# Patient Record
Sex: FEMALE | Race: WHITE
[De-identification: names, ages, dates, MRNs, and addresses within clinical notes are randomized per-mention and may not be internally consistent; named-entity substitution may affect disease eponyms.]

---

## 2018-02-05 ENCOUNTER — HOSPITAL ENCOUNTER (INPATIENT)
Dept: HOSPITAL 17 - NEPC | Age: 38
LOS: 1 days | Discharge: HOME | DRG: 287 | End: 2018-02-06
Attending: HOSPITALIST | Admitting: HOSPITALIST
Payer: SELF-PAY

## 2018-02-05 VITALS
DIASTOLIC BLOOD PRESSURE: 82 MMHG | SYSTOLIC BLOOD PRESSURE: 172 MMHG | HEART RATE: 101 BPM | RESPIRATION RATE: 18 BRPM | OXYGEN SATURATION: 100 % | TEMPERATURE: 98.7 F

## 2018-02-05 VITALS — HEIGHT: 60 IN | WEIGHT: 242.51 LBS | BODY MASS INDEX: 47.61 KG/M2

## 2018-02-05 VITALS
HEART RATE: 89 BPM | DIASTOLIC BLOOD PRESSURE: 74 MMHG | SYSTOLIC BLOOD PRESSURE: 141 MMHG | RESPIRATION RATE: 18 BRPM | OXYGEN SATURATION: 100 %

## 2018-02-05 VITALS — TEMPERATURE: 98.4 F

## 2018-02-05 DIAGNOSIS — F17.210: ICD-10-CM

## 2018-02-05 DIAGNOSIS — M16.10: ICD-10-CM

## 2018-02-05 DIAGNOSIS — E66.01: ICD-10-CM

## 2018-02-05 DIAGNOSIS — I20.1: Primary | ICD-10-CM

## 2018-02-05 DIAGNOSIS — R03.0: ICD-10-CM

## 2018-02-05 DIAGNOSIS — M54.9: ICD-10-CM

## 2018-02-05 DIAGNOSIS — D64.9: ICD-10-CM

## 2018-02-05 LAB
BASOPHILS # BLD AUTO: 0.1 TH/MM3 (ref 0–0.2)
BASOPHILS NFR BLD: 0.7 % (ref 0–2)
EOSINOPHIL # BLD: 0.3 TH/MM3 (ref 0–0.4)
EOSINOPHIL NFR BLD: 2.7 % (ref 0–4)
ERYTHROCYTE [DISTWIDTH] IN BLOOD BY AUTOMATED COUNT: 18.3 % (ref 11.6–17.2)
HCT VFR BLD CALC: 27.3 % (ref 35–46)
HGB BLD-MCNC: 8.6 GM/DL (ref 11.6–15.3)
LYMPHOCYTES # BLD AUTO: 2.8 TH/MM3 (ref 1–4.8)
LYMPHOCYTES NFR BLD AUTO: 22.1 % (ref 9–44)
MCH RBC QN AUTO: 19.3 PG (ref 27–34)
MCHC RBC AUTO-ENTMCNC: 31.3 % (ref 32–36)
MCV RBC AUTO: 61.7 FL (ref 80–100)
MONOCYTE #: 0.6 TH/MM3 (ref 0–0.9)
MONOCYTES NFR BLD: 4.8 % (ref 0–8)
NEUTROPHILS # BLD AUTO: 8.9 TH/MM3 (ref 1.8–7.7)
NEUTROPHILS NFR BLD AUTO: 69.7 % (ref 16–70)
PLATELET # BLD: 398 TH/MM3 (ref 150–450)
PMV BLD AUTO: 6.7 FL (ref 7–11)
RBC # BLD AUTO: 4.43 MIL/MM3 (ref 4–5.3)
WBC # BLD AUTO: 12.7 TH/MM3 (ref 4–11)

## 2018-02-05 PROCEDURE — 74174 CTA ABD&PLVS W/CONTRAST: CPT

## 2018-02-05 PROCEDURE — 99153 MOD SED SAME PHYS/QHP EA: CPT

## 2018-02-05 PROCEDURE — 80048 BASIC METABOLIC PNL TOTAL CA: CPT

## 2018-02-05 PROCEDURE — 84484 ASSAY OF TROPONIN QUANT: CPT

## 2018-02-05 PROCEDURE — 82550 ASSAY OF CK (CPK): CPT

## 2018-02-05 PROCEDURE — 84702 CHORIONIC GONADOTROPIN TEST: CPT

## 2018-02-05 PROCEDURE — 71045 X-RAY EXAM CHEST 1 VIEW: CPT

## 2018-02-05 PROCEDURE — C1893 INTRO/SHEATH, FIXED,NON-PEEL: HCPCS

## 2018-02-05 PROCEDURE — 99285 EMERGENCY DEPT VISIT HI MDM: CPT

## 2018-02-05 PROCEDURE — C1769 GUIDE WIRE: HCPCS

## 2018-02-05 PROCEDURE — 85379 FIBRIN DEGRADATION QUANT: CPT

## 2018-02-05 PROCEDURE — 93458 L HRT ARTERY/VENTRICLE ANGIO: CPT

## 2018-02-05 PROCEDURE — 85025 COMPLETE CBC W/AUTO DIFF WBC: CPT

## 2018-02-05 PROCEDURE — 93005 ELECTROCARDIOGRAM TRACING: CPT

## 2018-02-05 PROCEDURE — 85610 PROTHROMBIN TIME: CPT

## 2018-02-05 PROCEDURE — 71275 CT ANGIOGRAPHY CHEST: CPT

## 2018-02-05 PROCEDURE — 99152 MOD SED SAME PHYS/QHP 5/>YRS: CPT

## 2018-02-05 PROCEDURE — 85730 THROMBOPLASTIN TIME PARTIAL: CPT

## 2018-02-05 PROCEDURE — 93306 TTE W/DOPPLER COMPLETE: CPT

## 2018-02-05 NOTE — RADRPT
EXAM DATE/TIME:  02/05/2018 23:12 

 

[place] COMPARISON:     

No previous studies available for comparison.

 

                     

INDICATIONS :     

Short of breath, chest pains.

                     

 

MEDICAL HISTORY :     

None.          

 

SURGICAL HISTORY :     

None.   

 

ENCOUNTER:     

Initial                                        

 

ACUITY:     

1 day      

 

PAIN SCORE:     

6/10

 

LOCATION:     

Bilateral  chest

 

FINDINGS:     

Portable AP view of the chest demonstrates a normal-sized cardiac silhouette. No effusion, consolidat
ion, or pneumothorax is visualized. The bones and soft tissues demonstrate no acute abnormality.

 

CONCLUSION:     

No acute cardiopulmonary abnormality is identified.

 

 

 

 Tejinder Bonilla MD on February 05, 2018 at 23:25           

Board Certified Radiologist.

 This report was verified electronically.

## 2018-02-06 VITALS
TEMPERATURE: 98.5 F | HEART RATE: 88 BPM | DIASTOLIC BLOOD PRESSURE: 59 MMHG | SYSTOLIC BLOOD PRESSURE: 116 MMHG | RESPIRATION RATE: 18 BRPM | OXYGEN SATURATION: 97 %

## 2018-02-06 VITALS
SYSTOLIC BLOOD PRESSURE: 120 MMHG | DIASTOLIC BLOOD PRESSURE: 56 MMHG | RESPIRATION RATE: 18 BRPM | HEART RATE: 86 BPM | OXYGEN SATURATION: 100 %

## 2018-02-06 VITALS
HEART RATE: 87 BPM | OXYGEN SATURATION: 100 % | DIASTOLIC BLOOD PRESSURE: 57 MMHG | RESPIRATION RATE: 18 BRPM | SYSTOLIC BLOOD PRESSURE: 115 MMHG

## 2018-02-06 VITALS
DIASTOLIC BLOOD PRESSURE: 60 MMHG | OXYGEN SATURATION: 100 % | RESPIRATION RATE: 14 BRPM | SYSTOLIC BLOOD PRESSURE: 121 MMHG | HEART RATE: 90 BPM

## 2018-02-06 VITALS — HEART RATE: 88 BPM

## 2018-02-06 LAB
BUN SERPL-MCNC: 9 MG/DL (ref 7–18)
CALCIUM SERPL-MCNC: 8.4 MG/DL (ref 8.5–10.1)
CHLORIDE SERPL-SCNC: 107 MEQ/L (ref 98–107)
CREAT SERPL-MCNC: 0.7 MG/DL (ref 0.5–1)
GFR SERPLBLD BASED ON 1.73 SQ M-ARVRAT: 94 ML/MIN (ref 89–?)
GLUCOSE SERPL-MCNC: 105 MG/DL (ref 74–106)
HCO3 BLD-SCNC: 24 MEQ/L (ref 21–32)
INR PPP: 1 RATIO
PROTHROMBIN TIME: 10.1 SEC (ref 9.8–11.6)
SODIUM SERPL-SCNC: 140 MEQ/L (ref 136–145)
TROPONIN I SERPL-MCNC: 0.06 NG/ML (ref 0.02–0.05)
TROPONIN I SERPL-MCNC: 1.7 NG/ML (ref 0.02–0.05)

## 2018-02-06 PROCEDURE — B2111ZZ FLUOROSCOPY OF MULTIPLE CORONARY ARTERIES USING LOW OSMOLAR CONTRAST: ICD-10-PCS | Performed by: INTERNAL MEDICINE

## 2018-02-06 PROCEDURE — 4A023N7 MEASUREMENT OF CARDIAC SAMPLING AND PRESSURE, LEFT HEART, PERCUTANEOUS APPROACH: ICD-10-PCS | Performed by: INTERNAL MEDICINE

## 2018-02-06 NOTE — CATHPROC
[facility] HIS Report

Study Information

Study Number    Admission          Scheduled Start             Study Start

 

[id number]    Feb 6 2018 4:29AM      02/06/2018 Feb 6 2018 9:50AM

 

[employer]

 

Cardiac Catheterization

 

Admit Source               Facility Department

 

Emergency department           [facility] - Cath Lab

 

Physician and Clinical Staff

Initial Harrison Christianson          Circulator     Mauro RN, Leroy

 

                         Recorder      Tawanna Espinoza ,RT(R)

 

                         Scrub        Ann Nuñez,RT(R)

 

Procedures Performed

Procedure                    Location (Site)            Vessel Name

 

Coronary Angiograms                LCA                 Left Coronary

Coronary Angiograms                RCA                 Right Coronary

L Heart Cath

Equipment

Time            Description           Size         Mfg Part Number  Used/Scraped

                   TRANSDUCER, TRUWAVE                  [id number]

10:01    RAPHAEL GUTIERREZ                      *                    Used

                   W/STOCKCOCK                      *[id number]

                   WIRE, CHOICE PT EX. SUPPORT              02535-03

10:28    [employer]                    180CM                  Used

                   182CM                         *[id number]

                                              [phone]



                                              *[id number]

                                              538-420



                                              *[id number]

                                              534-523T



                                              *[id number]

                                              [id number]

10:01    [employer]    PACK, CCL CUSTOM        *                    Used

                                              *[id number]

10:01    [employer]    SUPPORT, ARTERIAL ADULT                [id number] *[id number] Used

                                              [id number]

10:01    [employer]      PEN, SKIN DUAL W/ RULER     *                    Used

                                              *[id number]

                   BAND, RADIAL COMPRESSION TR              [id number]

10:39    [employer]                      24CM                  Used

                   SHORT 24                       *[id number]

                   SHEATH, FR6 RADIAL PRELUDE

10:01    [employer]                      FR 6         [id number]   Used

                   EASE 11CM

                                              [id number]

10:28    [employer]      WIRE, EXCHANGE 260CM 3MMJ    260CM                  Used

                                              *[id number]

                                              [id number]

10:01    [employer]      WIRE, EXCHANGE 260CM 3MMJ    260CM                  Used

                                              *[id number]

10:01    [employer]         OMNIPAQUE, 350 MG, 150ML    150ML         [id number]      Used

                                              [id number]

10:01    [facility]      BLANKET,WARM AIR CCL      *                    Used

                                              *[id number]

 

Equipment Model, Serial, Lot Number and Expiration Data

Description              Model Number      Serial Number      Lot Number        Expiration Date

WIRE, CHOICE PT EX. SUPPORT

                                           [id number]         06-

182CM

 

History: Allergies

Allergy              Reaction

 

diphenhydramine

 

 

History: Risk Factors

                     Family History of

Hypertension    Dyslipidemia               Previous MI   Previous Heart Failure

                     Premature CAD

No         No          No         No        No

Prior Valve

          Prior PCI      Prior CABG

Surgery

No         No          No

          Cerebrovascular   Peripheral Artery  Chronic Lung

On Dialysis                                  Diabetes

          Disease       Disease       Disease

No         No          No         No        No

 

 

History: Symptoms/Diagnosis Selection Items

Chest pain

 

SOB

History: Stress Tests

Stress or Imaging Studies Performed

 

No

 

 

History: Other

Current Smoker    Method      Packs a Day      Years Used       Pack Years

 

Yes         Cigarettes    1           21           21

 

Labs

Hgb (g/dl)      Hct (%)         RBC (MIL/MM3)    WBC (l/cumm)      Platelets (thousands)

 

11.60-17.00     35.00-51.00       4.00-5.90      4.00-11.00       150..00

 

8.6         27.3          4.4         12.7          398

 

Glucose (mg/dl)   BUN (mg/dl)       Creatinine (mg/dl)  BUN:Creatinine (1:x)

74..00     7.00-18.00       0.50-1.30      10.00-20.00

 

105         9            0.7         12.9

 

Na (meq/l)      K (meq/l)        Cl (meq/l)      CO2 (mmol/L)      Ca (mg/dl)

 

136..00    3.50-5.10        98..00     21.00-32.00      8.50-10.10

 

140         3.9           107         24           8.4

 

PT (sec)       PTT (sec)          INR (PTT:PT)

 

9.80-11.60      24.30-30.10         0.90-1.10

 

10.1         27             1

 

Troponin I (ng/ml)  CPK (u/l)

 

0.02-0.05      26..00

 

1.7         124

 

 

 

 

Medication

Medication Total Dose (Bolus/Oral)

Medication            Total Dosage/Unit

 

1% XYLOCAINE              20 mL

 

FENTANYL               150 mcg

 

HEPARIN               5000 units

 

RADIAL COCKTAIL              4 mL (Bolus)

 

VERSED                  3 mg

Medications (Bolus/Oral)

Medication          Time Given          Dosage/Unit       Administered By      Reason

 

FENTANYL           2/6/2018 10:20:00 AM      50 mcg         Leroy Wade RN

50 mcg FENTANYL given in lab by Leroy Wade RN in Right Forearm via Peripheral IV. Ordered by Harrison Angel.

 

VERSED            2/6/2018 10:21:19 AM      1 mg         Leroy Wade RN

1 mg VERSED given in lab by Leroy Wade RN in Right Forearm via Peripheral IV. Ordered by Amor Angel

 

1% XYLOCAINE         2/6/2018 10:24:01 AM      20 mL         Harrison Angel

20 mL 1% XYLOCAINE given in lab by Harrison Angel in Right Radial via Subcutaneous. Ordered by Harrison Angel.

 

HEPARIN            2/6/2018 10:25:30 AM     5000 units        Leroy Wade RN

5000 units HEPARIN given in lab by Leroy Wade RN in Right Forearm via Peripheral IV. Ordered by Harrison Garcia.

 

RADIAL COCKTAIL        2/6/2018 10:26:24 AM      2 mL (Bolus)     Harrison Angel

2 mL (Bolus) RADIAL COCKTAIL given in lab by Harrison Angel in Right Radial via Radial. Using [Soluti
on Name]. Ordered by Harrison Angel. 200

MCG NTG

FENTANYL           2/6/2018 10:29:41 AM      25 mcg         Leroy Wade RN

25 mcg FENTANYL given in lab by Leroy Wade RN in Right Forearm via Peripheral IV. Ordered by Harrison Angel.

 

VERSED            2/6/2018 10:30:45 AM      1 mg         Leroy Wade RN

1 mg VERSED given in lab by Leroy Wade RN in Right Forearm via Peripheral IV. Ordered by Amor Angel
phen.

 

RADIAL COCKTAIL        2/6/2018 10:32:21 AM      2 mL (Bolus)     Harrison Angel

2 mL (Bolus) RADIAL COCKTAIL given in lab by Harrison Angel in Right Radial via Radial. Using [Soluti
on Name]. Ordered by Harrison Angel. 200

MCG NTG

FENTANYL           2/6/2018 10:34:00 AM      25 mcg         Mauro GRAMAJO, Leroy

25 mcg FENTANYL given in lab by Mauro GRAMAJO, Leroy in Right Forearm via Peripheral IV. Ordered by Harrison Angel.

 

VERSED            2/6/2018 10:35:00 AM      1 mg         Mauro GRAMAJO, Leroy

1 mg VERSED given in lab by Leory Wade RN in Right Forearm via Peripheral IV. Ordered by Amor Angel.

 

FENTANYL           2/6/2018 10:45:13 AM      50 mcg         Leroy Wade RN

50 mcg FENTANYL given in lab by Leroy Wade RN in Right Forearm via Peripheral IV. Ordered by Harrison Angel.

 

Medication (Drip)

Medication          Time Given          Dosage/Unit      Concentration/Unit  Diluent (ml)  Solution

 

IV Solutions         2/6/2018 9:50:44 AM       0 mL (IV)                 500       NaCl .9

IV Solutions given in lab by Leroy Wade RN in Right Forearm via Peripheral IV. Pump/Drip Flow = 20 
ml/hr using NaCl .9. Ordered by Harrison Angel.

Initial Case Assessment

Cardiovascular

HR           Rhythm          NIBP           Chest Pain

 

93           sr            135/84          0

 

Edema Present      Skin color            Skin

 

None           Normal              Warm Dry

 

Circulatory - Right Pulses

Dorsalis Pedis     Femoral         Radial

 

1            2            2

 

Scale (0,1,2,3,4,d)

 

Scale (0,1,2,3,4,d)

 

Neurological State

            Oriented to time-place-

Alert                      Moves all extremities

            person

 

Respiration - General

Respiration Rate

            SpO2 (%)

(B/min)

20           99

 

Final Case Assessment

Cardiovascular

HR           Rhythm          NIBP           Chest Pain

 

94           sr            144/82          0

 

Edema Present      Skin color            Skin

 

None           Normal              Warm Dry

 

Circulatory - Right Pulses

Dorsalis Pedis     Femoral         Radial

 

1            2            2

 

Scale (0,1,2,3,4,d)

 

Scale (0,1,2,3,4,d)

 

Neurological State

            Oriented to time-place-

Alert                      Moves all extremities

            person

 

Respiration - General

Respiration Rate

            SpO2 (%)

(B/min)

20           99

 

Chronological Log

Time    Study Chronological Log

 

9:50:29   Patient arrived via Bed.

 

9:50:30   Patient Name, D.O.B, / Armband Verified By R.N.

 

9:50:31   Consent signed by the physician and the patient and verified by the Cath Lab staff.

9:50:32  Pre-op and post- op instructions given; patient acknowledges understanding of instructions.

 

9:50:32  Verbal Stimulation=2 Physical Stimulation=2 Airway=2 Respiration=2 TOTAL=8. (0=absent, 1=duff
ited, 2=present)

 

9:50:34  Presedation assessment performed by Cath Lab RN.

 

9:50:35  Allens test performed on the left radial and ulnar artery.

 

9:50:39  Patient has been NPO for More than 6Hrs.

 

9:50:40  Skin Breakdown- none per patient

 

9:50:41  Patient Warmer Placed on the Table.

 

9:50:41  [place] Prominences Protected

 

9:50:43  A # 20 IV was noted in the Forearm (right). Grade = patent

      IV Solutions given in lab by Leroy Wade RN in Right Forearm via Peripheral IV. Pump/Drip Flow
 = 20 ml/hr using NaCl

9:50:44

      .9. Ordered by Harrison Angel.

9:50:45  History and physical on the chart or being dictated.

      Assessment: Initial Case, HR=93 BPM, Rhythm=sr, SCRA=759/84 mmhg, Chest Pain=0, Edema=None, Col
or=Normal,

      Skin = Warm, Dry

9:50:46  Right Pulses: Korey Ped=1, Femoral=2, Radial=2

      Neurological: State=Alert, Ox3, RIZVI

      Respiration: Resp=20 B/min, SpO2=99 %

9:54:36  A # 20 IV was noted in the Antecubital (left). Grade = patent

      Vitals capture started with the following parameters, Patient=Adult, Interval=5 min, Initial Pr
ijinwj=560 mmHg,

9:55:59

      Deflation Rate=5 mmHg, Cuff placed on Left Arm

9:57:09  CNSX=097/81 mmhg, SvV4=346.0 %

 

10:01:37  HR=90 bpm, VLND=222/84 mmhg, SpO2=99.0 %, Resp=25 B/min

 

10:03:14  Right groin and right radial prepped with 2% chlorhexidine, and draped after a 3 min. waiti
ng time.

 

10:06:20  Reference ECG taken

 

10:06:36  HR=89 bpm, ARGH=710/73 mmhg, GnF4=663.0 %, Resp=15 B/min, Pain=0, Ginna=6, Longo=2

 

10:09:02  MD paged

 

10:11:35  HR=88 bpm, TIOG=621/74 mmhg, XjO5=009.0 %, Resp=13 B/min, Pain=0, Ginna=6, Longo=2

 

10:11:37  MD responded

 

10:12:40  Pressure channel 1 zeroed.

 

10:16:34  HR=83 bpm, SIIC=572/76 mmhg, LoW2=982.0 %, Resp=16 B/min, Pain=0, Ginna=6, Longo=2

 

10:20:00  50 mcg FENTANYL given in lab by Leroy Wade RN in Right Forearm via Peripheral IV. Ordered
 by Harrison Angel.

 

10:20:02  MD arrived.

 

10:21:19  1 mg VERSED given in lab by Leroy Wade RN in Right Forearm via Peripheral IV. Ordered by 
Harrison Angel.

 

10:21:35  HR=86 bpm, ZQUY=572/78 mmhg, WoT7=364.0 %, Resp=14 B/min, Pain=0, Ginna=6, Longo=2

      Time Out. Correct patient, correct procedure, correct physician, power injector not loaded with
 contrast with surgical

10:22:00

      team present. Time Out Concurred by MD and individual staff in procedure.

10:23:20  Case Start

 

10:24:01  20 mL 1% XYLOCAINE given in lab by Harrison Angel in Right Radial via Subcutaneous. Ordered
 by Harrison Angel.

 

10:24:40  Access site was Right Radial Artery.

 

10:25:30  5000 units HEPARIN given in lab by Leroy Wade RN in Right Forearm via Peripheral IV. Orde
red by Harrison Angel.

      A SHEATH, FR6 RADIAL PRELUDE EASE 11CM FR 6 was advanced into the Radial (right) using the Perc
utaneous

10:25:49

      technique.

      2 mL (Bolus) RADIAL COCKTAIL given in lab by Harrison Angel in Right Radial via Radial. Using [
Solution Name].

10:26:24

      Ordered by Harrison Angel. 200 MCG NTG

10:26:34  HR=90 bpm, IZVI=689/78 mmhg, SpO2=96.0 %, Resp=18 B/min, Pain=0, Ginna=6, Longo=2

      A JR 5.0 INFINITI CATHETER FR 5 was advanced over a wire. OMNIPAQUE, 350 MG, 150ML 150ML was us
ed for

10:26:48

      injections.

      Recorded Pressure: LV, HR=86, Condition=Condition 1

10:29:39

      (Left Ventricle) /6/11

10:29:41  25 mcg FENTANYL given in lab by Leroy Wade RN in Right Forearm via Peripheral IV. Ordered
 by Harrison Angel.

      Recorded Pressure: LV, Ao, HR=86, Condition=Condition 1

10:29:45  (Left Ventricle) /6/15,

      (Aorta) Ao 107/67/85

10:30:45  1 mg VERSED given in lab by Leroy Wade RN in Right Forearm via Peripheral IV. Ordered by 
Harrison Angel.

 

10:30:53  The  RCA was injected and visualized at various angles. OMNIPAQUE, 350 MG, 150ML 150ML used
.

 

10:31:16  Catheter was removed

      A JL 3.5 INFINITI CATHETER FR 5 was advanced over a wire. OMNIPAQUE, 350 MG, 150ML 150ML was us
ed for

10:31:26

      injections.

10:31:31  HR=88 bpm, MCMU=810/86 mmhg, SpO2=98.0 %, Resp=14 B/min, Pain=0, Ginna=6, Longo=2

      2 mL (Bolus) RADIAL COCKTAIL given in lab by Harrison Angel in Right Radial via Radial. Using [
Solution Name].

10:32:21

      Ordered by Harrison Angel. 200 MCG NTG

10:33:28  Catheter was removed

 

10:34:00  25 mcg FENTANYL given in lab by Leroy Wade RN in Right Forearm via Peripheral IV. Ordered
 by Harrison Angel.

      A JL 4.0 INFINITI CATHETER FR 4 was advanced over a wire. OMNIPAQUE, 350 MG, 150ML 150ML was us
ed for

10:34:14

      injections.

10:35:00  1 mg VERSED given in lab by Leroy Wade RN in Right Forearm via Peripheral IV. Ordered by 
Harrison Angel.

 

10:36:34  HR=88 bpm, DRPQ=748/82 mmhg, SpO2=96.0 %, Resp=18 B/min, Pain=0, Ginna=6, Longo=2

      Recorded Pressure: Ao, HR=93, Condition=Condition 1

10:37:42

      (Aorta) Ao 112/74/90

10:37:46  The  LCA was injected and visualized at various angles. OMNIPAQUE, 350 MG, 150ML 150ML used
.

 

10:38:59  Catheter was removed

 

10:39:06  Case End

      Assessment: Final Case, HR=94 BPM, Rhythm=sr, RLDQ=446/82 mmhg, Chest Pain=0, Edema=None, Color
=Normal,

      Skin = Warm, Dry

10:39:28  Right Pulses: Korey Ped=1, Femoral=2, Radial=2

      Neurological: State=Alert, Ox3, RIZVI

      Respiration: Resp=20 B/min, SpO2=99 %

10:39:53  Catheter(s) removed without difficulty

      Radial Compression Device Used. 11 mLs of air placed in BAND, RADIAL COMPRESSION TR SHORT 24 24
CM. Affected

10:40:05

      hand 97 % O2 saturation.

10:40:20  Sterile dressing applied to site

 

10:40:21  No case complications noted.

 

10:40:22  Cine recording checked.

 

10:40:23  Bedside Report will be given.

 

10:40:35  Contrast Scanned

 

10:40:40  A Left Heart Cath was performed.

 

10:41:38  HR=93 bpm, LMGV=991/84 mmhg, SpO2=97.0 %, Resp=21 B/min, Pain=0, Ginna=6, Longo=2

 

10:45:13  50 mcg FENTANYL given in lab by Leroy Wade RN in Right Forearm via Peripheral IV. Ordered
 by Harrison Angel.

 

10:46:33  HR=91 bpm, HRUY=021/90 mmhg, SpO2=98.0 %, Resp=20 B/min, Pain=0, Ginna=6, Longo=2

 

10:49:46  Vitals capture stopped.

 

10:52:11  [facility]

End Study - Contrast Media Used In Study

Contrast      Total Opened (mL)  Total Used (mL)     Total Wasted (mL)

 

Omnipaque     25         25            0

 

End Study - Maximum Contrast Load

Max Contrast Load (mL)

 

785.7

 

End Study - Radiation Exposure

Fluoro Time

(minutes)

3.8

 

 

End Study - Patient Disposition

Complications   Transferred To       Interventional Outcome

 

No         Telemetry Bed       No attempt made

## 2018-02-06 NOTE — MA
cc:

NATALIA CARDOZO

****

 

 

DATE

2/6/2018

 

INDICATION

Non-ST-elevation MI.

 

PROCEDURE PERFORMED

1. Fluoroscopy with interpretation

2. Coronary angiography

3. Left heart catheterization

 

METHOD

The risks, benefits and alternatives discussed with the patient, the patient

understood and consented to the procedure.

 

PROCEDURE

The patient was brought into the catheterization lab, placed on the

catheterization table.  The right wrist was prepped and draped in a sterile

fashion.  The right wrist was anesthetized with 2% lidocaine.  The right radial

artery is cannulated and a 6-Macedonian 7 cm sheath was placed without difficulty.

 

LEFT HEART CATHETERIZATION

Intraventricular hemodynamics measured at 116/6 mmHg.  The left end-diastolic

pressure was 15 mmHg.

 

CORONARY ANGIOGRAPHY

The left main coronary is angiographically normal.

 

The left anterior descending coronary artery is tortuous, but angiographically

normal.  The diagonal branch is angiographically normal.

 

The left circumflex gives rise to a larger obtuse marginal branch and is

angiographically normal.

 

The right coronary artery is a dominant vessel giving rise to the posterior

descending branch and is angiographically normal.

 

CONCLUSIONS

1. Angiographically normal coronary arteries.

2. Normal left-sided filling pressures.

 

PLAN

The patient will be monitored closely for any post procedural complications.

Troponin elevation is likely secondary to coronary vasospasm with Prinzmetal

angina as her symptoms were suggested.  There is no evidence for spontaneous

coronary dissection, anomalous coronary artery, or fixed obstructive coronary

disease.  We will start on baby aspirin.  We will hold off on beta blocker

given that we are going to initiate a non-dihydropyridine calcium channel

blocker for coronary vasospasm.  Since there is no traditional atherosclerosis,

statin therapy is not indicated.  We will follow up with a 2-D echocardiogram.

I will watch her today just to make sure she does not have any arrhythmias with

a small troponin elevation and anticipate discharge tomorrow.

 

 

 

 

 

                              _________________________________

                              MD KELLY Long/MONE

D:  2/6/2018/10:50 AM

T:  2/6/2018/11:06 AM

Visit #:  [id number]

Job #:  [id number]

## 2018-02-06 NOTE — EKG
Date Performed: 02/05/2018       Time Performed: 23:09:28

 

PTAGE:      37 years

 

EKG:      Sinus rhythm 

 

 NORMAL ECG Since the prior tracing, there has been no significant change 

 

 PREVIOUS TRACING            : 04/29/2016 09.26

 

DOCTOR:   Dejon Guerrero  Interpretating Date/Time  02/06/2018 16:11:05

## 2018-02-06 NOTE — EKG
Date Performed: 02/06/2018       Time Performed: 09:37:27

 

PTAGE:      37 years

 

EKG:      Sinus rhythm 

 

 NORMAL ECG Since the prior tracing, there has been no significant change 

 

 PREVIOUS TRACING            : 02/06/2018 02.50

 

DOCTOR:   Dejon Guerrero  Interpretating Date/Time  02/06/2018 15:48:30

## 2018-02-06 NOTE — PD.CONS
HPI


Consult Requested By





Primary Care Physician


Connie West MD


History of Present Illness


37-year-old female with a past medical history of hip OA who presented for 

chest pain.  The patient states that she was walking out of the bathroom last 

night and developed sudden onset of chest pain.  She describes the pain as a 

squeezing sensation.  8/10 in severity.  Pain is been constant overnight, 

denies any change.  Pain radiates to the upper back and right arm.  Associated 

FRANCIS and had an episode of vomiting after onset.  She denies any relieving 

factors.  She denies any history of HTN, HLD, DM, or heart disease.  She denies 

any family history of heart disease or early MI.  In the ED she had a normal d-

dimer and CTA of the chest was negative for dissection or aneurysm.


 (Colby Borrego)





Review of Systems


Negative except as stated in the history of present illness


 (Colby Borrego)





Past Family Social History


Allergies:  


Coded Allergies:  


     diphenhydramine (Unverified  Allergy, Unknown, 18)


Past Medical History


Hip osteoarthritis


Morbid obesity


Past Surgical History


 2


Cholecystectomy


Tonsillectomy


Reported Medications





Reported Meds & Active Scripts


Active


Mobic (Meloxicam) 15 Mg Tab 15 Mg PO DAILY PRN


Active Ordered Medications





Current Medications








 Medications


  (Trade)  Dose


 Ordered  Sig/Ronnell


 Route  Start Time


 Stop Time Status Last Admin


 


 Heparin Sodium/


 Dextrose  250 ml @ 


 10 mls/hr  TITRATE  PRN


 IV  18 04:45


    18 06:13


 


 


 Potassium


 Chloride/Dextrose/


 Sod Cl  1,000 ml @ 


 75 mls/hr  [id number]


 IV  18 05:26


    18 06:31


 


 


  (NS Flush)  2 ml  BID


 IV FLUSH  18 09:00


     


 


 


  (NS Flush)  2 ml  UNSCH  PRN


 IV FLUSH  18 05:30


     


 


 


  (Tylenol)  500 mg  Q4H  PRN


 PO  18 05:30


     


 


 


  (Morphine Inj)  4 mg  Q3H  PRN


 IV PUSH  18 05:30


     


 








Family History


Reports grandmother has diabetes.  Denies any family history of heart disease 

or early MI.


Social History


Smokes half a pack daily


 (Colby Borrego)





Physical Exam


Vital Signs





Vital Signs








  Date Time  Temp Pulse Resp B/P (MAP) Pulse Ox O2 Delivery O2 Flow Rate FiO2


 


18 07:34 98.5 88 18 116/59 (78) 97   


 


18 06:48  86 18 120/56 (77) 100 Room Air  


 


18 06:48        


 


18 04:54  90 14 121/60 (80) 100   


 


18 03:07  87 18 115/57 (76) 100   


 


18 23:49  89 18 141/74 (96) 100   


 


18 22:40   22     


 


18 22:22 98.7 101 18 172/82 (112) 100   








Physical Exam


GENERAL: Well-developed well-nourished.  Morbidly obese.  In no acute distress.


NECK: No carotid bruits.  No JVD.  


CARDIOVASCULAR: Regular rate and rhythm.  No murmur appreciated.


RESPIRATORY: No accessory muscle use. Clear to auscultation. Breath sounds 

equal bilaterally. 


MUSCULOSKELETAL: No clubbing or cyanosis. No edema. 


NEUROLOGICAL: Awake and alert. Normal speech.


Laboratory





Laboratory Tests








Test


  18


23:39 18


23:42 18


02:45


 


White Blood Count 12.7   


 


Red Blood Count 4.43   


 


Hemoglobin 8.6   


 


Hematocrit 27.3   


 


Mean Corpuscular Volume 61.7   


 


Mean Corpuscular Hemoglobin 19.3   


 


Mean Corpuscular Hemoglobin


Concent 31.3 


  


  


 


 


Red Cell Distribution Width 18.3   


 


Platelet Count 398   


 


Mean Platelet Volume 6.7   


 


Neutrophils (%) (Auto) 69.7   


 


Lymphocytes (%) (Auto) 22.1   


 


Monocytes (%) (Auto) 4.8   


 


Eosinophils (%) (Auto) 2.7   


 


Basophils (%) (Auto) 0.7   


 


Neutrophils # (Auto) 8.9   


 


Lymphocytes # (Auto) 2.8   


 


Monocytes # (Auto) 0.6   


 


Eosinophils # (Auto) 0.3   


 


Basophils # (Auto) 0.1   


 


CBC Comment DIFF FINAL   


 


Differential Comment    


 


Blood Urea Nitrogen 9   


 


Creatinine 0.70   


 


Random Glucose 105   


 


Calcium Level 8.4   


 


Sodium Level 140   


 


Potassium Level 3.9   


 


Chloride Level 107   


 


Carbon Dioxide Level 24.0   


 


Anion Gap 9   


 


Estimat Glomerular Filtration


Rate 94 


  


  


 


 


Total Creatine Kinase 58   


 


Troponin I 0.06   0.42 


 


Prothrombin Time  10.1  


 


Prothromb Time International


Ratio 


  1.0 


  


 


 


Activated Partial


Thromboplast Time 


  27.0 


  


 


 


D-Dimer Quantitative (PE/DVT)  0.37  








 (Colby Borrego)


Result Diagram:  


18 2339                                                                    

            18 2339





Imaging





Last Impressions








Aorta CTA 18 0000 Signed





Impressions: 





 Service Date/Time:  2018 01:43 - CONCLUSION:  1. No acute 





 finding is identified within the abdomen or pelvis. There is no aneurysm or 





 dissection. 2. There is a 5.8 cm left adnexal cystic lesion that appears 

simple. 





 This likely arises from the left ovary given the anatomic location. Given the 





 size suggest follow-up ultrasound imaging in approximately 6-8 weeks to 

confirm 





 resolution.     Tejinder Bonilla MD 


 


Chest X-Ray 18 2241 Signed





Impressions: 





 Service Date/Time:  2018 23:12 - CONCLUSION:  No acute 





 cardiopulmonary abnormality is identified.     Tejinder Bonilla MD 








 (Colby Borrego)





Assessment and Plan


Assessment and Plan


37-year-old female with a past medical history of hip OA who presented for 

chest pain.  The patient states that she was walking out of the bathroom last 

night and developed sudden onset of chest pain.  She describes the pain as a 

squeezing sensation.  8/10 in severity.  Pain is been constant overnight, 

denies any change.  Pain radiates to the upper back and right arm.  Associated 

FRANCIS and had an episode of vomiting after onset.  She denies any relieving 

factors.  She denies any history of HTN, HLD, DM, or heart disease.  She denies 

any family history of heart disease or early MI.  In the ED she had a normal d-

dimer and CTA of the chest was negative for dissection or aneurysm.





Atypical chest pain/elevated troponin: Troponin is elevated to 0.42 and patient 

placed on heparin drip. EKG w/ NSR. Possible NSTEMI.


 (Colby Borrego)


Assessment and Plan


NSTEMI - + chest pain.  trop 0.06 -> 0.42


discussed options.  proceed with [facility] for definitive diagnosis.  rule out 

congenital anomaly vs spontaneous dissection vs printzmetals vs plaque rupture


b-HCG stat


2d echo


turn heparin gtt off on way to lab


NPO


 (Harrison Angel MD)











Colby Borrego 2018 08:12


Harrison Angel MD 2018 08:23

## 2018-02-06 NOTE — HHI.DCPOC
Discharge Care Plan


Goals to Promote Your Health


* To prevent worsening of your condition and complications


* To maintain your health at the optimal level


Directions to Meet Your Goals


*** Take your medications as prescribed


*** Follow your dietary instruction


*** Follow activity as directed








*** Keep your appointments as scheduled


*** Take your immunizations and boosters as scheduled


*** If your symptoms worsen call your PCP, if no PCP go to Urgent Care Center 

or Emergency Room***


*** Smoking is Dangerous to Your Health. Avoid second hand smoke***


***Call the 24-hour hour crisis hotline for domestic abuse at [phone]***











Chanel Villegas MD Feb 6, 2018 11:23

## 2018-02-06 NOTE — EKG
Date Performed: 02/06/2018       Time Performed: 02:50:20

 

PTAGE:      37 years

 

EKG:      Sinus rhythm 

 

 INFERIOR MYOCARDIAL INFARCTION ABNORMAL ECG Since the prior tracing, there has been no significant c
tiara 

 

 PREVIOUS TRACING            : 02/05/2018 23.09

 

DOCTOR:   Dejon Guerrero  Interpretating Date/Time  02/06/2018 15:48:21

## 2018-02-06 NOTE — HHI.HP
__________________________________________________





[facility]


Service


[facility]


Primary Care Physician


Connie West MD


Admission Diagnosis





Non-STEMI


Diagnoses:  


Travel History


International Travel<30 Days:  No


Contact w/Intl Traveler <30 Da:  No


Traveled to Known Affected Are:  No


History of Present Illness


37-year-old female with no significant past medical history presents to the 

emergency department for the evaluation of chest pain.  She describes the chest 

pain is substernal and a tightness.  It is associated with shortness of breath.

  Denies diaphoresis.  She states the pain radiates down her right arm into her 

upper back.  She endorses several episodes of nausea/vomiting.  Denies fever/

chills.  She denies lower extremity swelling.





Review of Systems


Except as stated in HPI:  all other systems reviewed are Neg





Past Family Social History


Past Medical History


None


Past Surgical History


 2


Cholecystectomy


Tonsillectomy


Reported Medications





Reported Meds & Active Scripts


Active


Mobic (Meloxicam) 15 Mg Tab 15 Mg PO DAILY PRN


Allergies:  


Coded Allergies:  


     diphenhydramine (Unverified  Allergy, Unknown, 18)


Family History


Negative for DM/CAD


Social History


Smokes approximately half a pack per day.  Occasional alcohol.  Denies illicit 

drugs.





Physical Exam


Vital Signs





Vital Signs








  Date Time  Temp Pulse Resp B/P (MAP) Pulse Ox O2 Delivery O2 Flow Rate FiO2


 


18 04:54  90 14 121/60 (80) 100   


 


18 03:07  87 18 115/57 (76) 100   


 


18 23:49  89 18 141/74 (96) 100   


 


18 22:40   22     


 


18 22:22 98.7 101 18 172/82 (112) 100   








Physical Exam


GENERAL: Obese,  female sitting up in bed


SKIN: No rashes, ecchymoses or lesions. Cool and dry.


HEAD: Atraumatic. Normocephalic. No temporal or scalp tenderness.


EYES: Pupils equal round and reactive. Extraocular motions intact. No scleral 

icterus. No injection or drainage. 


ENT: Nose without bleeding, purulent drainage or septal hematoma. Throat 

without erythema, tonsillar hypertrophy or exudate. Uvula midline. Airway 

patent.


NECK: Trachea midline. No JVD or lymphadenopathy. Supple, nontender, no 

meningeal signs.


CARDIOVASCULAR: Regular rate and rhythm without murmurs, gallops, or rubs. 


RESPIRATORY: Clear to auscultation. Breath sounds equal bilaterally. No wheezes

, rales, or rhonchi.  


GASTROINTESTINAL: Abdomen soft, non-tender, nondistended. No hepato-splenomegaly

, or palpable masses. No guarding.


MUSCULOSKELETAL: Extremities without clubbing, cyanosis, or edema. No joint 

tenderness, effusion, or edema noted. No calf tenderness. 


NEUROLOGICAL: Awake and alert. Cranial nerves II through XII intact.  Motor and 

sensory grossly within normal limits. Normal speech.


Laboratory





Laboratory Tests








Test


  18


23:39 18


23:42 18


02:45


 


White Blood Count 12.7   


 


Red Blood Count 4.43   


 


Hemoglobin 8.6   


 


Hematocrit 27.3   


 


Mean Corpuscular Volume 61.7   


 


Mean Corpuscular Hemoglobin 19.3   


 


Mean Corpuscular Hemoglobin


Concent 31.3 


  


  


 


 


Red Cell Distribution Width 18.3   


 


Platelet Count 398   


 


Mean Platelet Volume 6.7   


 


Neutrophils (%) (Auto) 69.7   


 


Lymphocytes (%) (Auto) 22.1   


 


Monocytes (%) (Auto) 4.8   


 


Eosinophils (%) (Auto) 2.7   


 


Basophils (%) (Auto) 0.7   


 


Neutrophils # (Auto) 8.9   


 


Lymphocytes # (Auto) 2.8   


 


Monocytes # (Auto) 0.6   


 


Eosinophils # (Auto) 0.3   


 


Basophils # (Auto) 0.1   


 


CBC Comment DIFF FINAL   


 


Differential Comment    


 


Blood Urea Nitrogen 9   


 


Creatinine 0.70   


 


Random Glucose 105   


 


Calcium Level 8.4   


 


Sodium Level 140   


 


Potassium Level 3.9   


 


Chloride Level 107   


 


Carbon Dioxide Level 24.0   


 


Anion Gap 9   


 


Estimat Glomerular Filtration


Rate 94 


  


  


 


 


Total Creatine Kinase 58   


 


Troponin I 0.06   0.42 


 


D-Dimer Quantitative (PE/DVT)  0.37  








Result Diagram:  


18








Caprini VTE Risk Assessment


Caprini VTE Risk Assessment:  Mod/High Risk (score >= 2)


Caprini Risk Assessment Model











 Point Value = 1          Point Value = 2  Point Value = 3  Point Value = 5


 


Age 41-60


Minor surgery


BMI > 25 kg/m2


Swollen legs


Varicose veins


Pregnancy or postpartum


History of unexplained or recurrent


   spontaneous 


Oral contraceptives or hormone


   replacement


Sepsis (< 1 month)


Serious lung disease, including


   pneumonia (< 1 month)


Abnormal pulmonary function


Acute myocardial infarction


Congestive heart failure (< 1 month)


History of inflammatory bowel disease


Medical patient at bed rest Age 61-74


Arthroscopic surgery


Major open surgery (> 45 min)


Laparoscopic surgery (> 45 min)


Malignancy


Confined to bed (> 72 hours)


Immobilizing plaster cast


Central venous access Age >= 75


History of VTE


Family history of VTE


Factor V Leiden


Prothrombin [id number]


Lupus anticoagulant


Anticardiolipin antibodies


Elevated serum homocysteine


Heparin-induced thrombocytopenia


Other congenital or acquired


   thrombophilia Stroke (< 1 month)


Elective arthroplasty


Hip, pelvis, or leg fracture


Acute spinal cord injury (< 1 month)








Prophylaxis Regimen











   Total Risk


Factor Score Risk Level Prophylaxis Regimen


 


0-1      Low Early ambulation


 


2 Moderate Order ONE of the following:


*Sequential Compression Device (SCD)


*Heparin 5000 units SQ BID


 


3-4 Higher Order ONE of the following medications:


*Heparin 5000 units SQ TID


*Enoxaparin/Lovenox 40 mg SQ daily (WT < 150 kg, CrCl > 30 mL/min)


*Enoxaparin/Lovenox 30 mg SQ daily (WT < 150 kg, CrCl > 10-29 mL/min)


*Enoxaparin/Lovenox 30 mg SQ BID (WT < 150 kg, CrCl > 30 mL/min)


AND/OR


*Sequential Compression Device (SCD)


 


5 or more Highest Order ONE of the following medications:


*Heparin 5000 units SQ TID (Preferred with Epidurals)


*Enoxaparin/Lovenox 40 mg SQ daily (WT < 150 kg, CrCl > 30 mL/min)


*Enoxaparin/Lovenox 30 mg SQ daily (WT < 150 kg, CrCl > 10-29 mL/min)


*Enoxaparin/Lovenox 30 mg SQ BID (WT < 150 kg, CrCl > 30 mL/min)


AND


*Sequential Compression Device (SCD)











Assessment and Plan


Assessment and Plan


Assessment/plan:





1.  NSTEMI


EKG shows normal sinus rhythm, pulse 86, no ST segment elevations or depressions

, personally reviewed


Initial troponin 0.06, repeat 0.42


Heparin drip


Aspirin, Plavix


Morphine for pain


Telemetry


Serial troponin/EKGs


Cardiology consulted, appreciate recommendations





2.  Osteoarthritis


Pain control as above


Holding home Mobic





FEN


NPO


D5 1/2 NS + 20 KCl at 75 cc/hr


Electrolytes: monitor and replete prn


Heparin ggt





Physician Certification


2 Midnight Certification Type:  Admission for Inpatient Services


Order for Inpatient Services


The services are ordered in accordance with Medicare regulations or non-

Medicare payer requirements, as applicable.  In the case of services not 

specified as inpatient-only, they are appropriately provided as inpatient 

services in accordance with the 2-midnight benchmark.


Estimated LOS (days):  2


2 days is the estimated time the patient will need to remain in the hospital, 

assuming treatment plan goals are met and no additional complications.


Post-Hospital Plan:  Not yet determined











Beena Jackman MD 2018 05:33

## 2018-02-06 NOTE — ECHRPT
Indication:

 

 CONCLUSIONS

 The left ventricular systolic function is hyperdynamic with an estimated ejection fraction in the ra
nge of 65-

 70%. 

 Normal left ventricular size. 

 Wall thickness is normal. 

 

 BP:  116   / 59      HR: 88                       Rhythm:           Sinus

 

 MEASUREMENTS  (Male / Female) Normal Values       Technical Quality:Fair

 2D ECHO

 LV Diastolic Diameter PLAX        4.7 cm                4.2 - 5.9 / 3.9 - 5.3 cm

 LV Systolic Diameter PLAX         3.2 cm                

 IVS Diastolic Thickness           1.0 cm                0.6 - 1.0 / 0.6 - 0.9 cm

 LVPW Diastolic Thickness          1.0 cm                0.6 - 1.0 / 0.6 - 0.9 cm

 LV Relative Wall Thickness        0.4                   

 LVOT Diameter                     1.9 cm                

 LA Systolic Diameter LX           3.9 cm                3.0 - 4.0 / 2.7 - 3.8 cm

 LV Ejection Fraction MOD 4C       65.3 %                

 LV Cardiac Index MOD 4C           1936.4 cm/minm     

 LV Ejection Fraction 4C AL        66.1 %                

 LV Cardiac Index 4C AL            2009.6 cm/minm     

 

 M-MODE

 Aortic Root Diameter MM           2.4 cm                

 AV Cusp Separation MM             1.8 cm                

 

 DOPPLER

 AV Peak Velocity                  166.0 cm/s            

 AV Peak Gradient                  11.0 mmHg             

 LVOT Peak Velocity                120.0 cm/s            

 LVOT Peak Gradient                5.8 mmHg              

 AV Area Cont Eq pk                2.0 cm               

 MV Area PHT                       3.9 cm               

 Mitral E Point Velocity           114.0 cm/s            

 Mitral A Point Velocity           80.9 cm/s             

 Mitral E to A Ratio               1.4                   

 PV Peak Velocity                  122.0 cm/s            

 PV Peak Gradient                  6.0 mmHg              

 

 

 FINDINGS

 

 LEFT VENTRICLE

 The left ventricular systolic function is hyperdynamic with an estimated ejection fraction in the ra
nge of 65-

 70%. 

 Normal left ventricular size. 

 Wall thickness is normal. 

 

 RIGHT VENTRICLE

 Normal right ventricular size and systolic function.  

 

 LEFT ATRIUM

 The left atrial size is normal.  

 

 RIGHT ATRIUM

 The right atrial size is normal.  

 

 ATRIAL SEPTUM

 Normal atrial septal thickness without atrial level shunting by limited color doppler interrogation.
  

 

 AORTA

 The aortic root and proximal ascending aorta are normal in size on limited imaging.  

 

 MITRAL VALVE

 Structurally normal mitral valve. No mitral valve stenosis or regurgitation.  

 

 AORTIC VALVE

 Trileaflet aortic valve. No aortic valve stenosis or regurgitation.  

 

 TRICUSPID VALVE

 Structurally normal tricuspid valve. No tricuspid valve stenosis or regurgitation.  

 

 PULMONARY VALVE

 The pulmonary valve is not well visualized.  

 

 VESSELS

 The inferior vena cava is normal in size.  

 

 PERICARDIUM

 No pericardial effusion.  

 

 

 

 

  Harrison Angel MD, FACC

  (Electronically Signed)

  Final Date:06 February 2018 17:29

## 2018-02-06 NOTE — RADRPT
EXAM DATE/TIME:  2018 01:43 

 

[facility] COMPARISON:     

No previous studies available for comparison.

 

 

INDICATIONS :     

Chest and back pain.

                           

 

IV CONTRAST:     

70 cc Omnipaque 350 (iohexol) IV 

 

 

RADIATION DOSE:     

20.52 CTDIvol (mGy) 

 

 

MEDICAL HISTORY :     

None  

 

SURGICAL HISTORY :      

Cholecystectomy.  section.

 

ENCOUNTER:      

Initial

 

ACUITY:      

1 day

 

PAIN SCALE:      

8/10

 

LOCATION:        

chest back

 

TECHNIQUE:     

Volumetric scanning was performed using a multi-row detector CT scanner.  The data was post processed
 with a variety of visualization algorithms including full volume maximum intensity projection, multi
-planar sliding thin slab reformation, curved planar reformation, and surface rendering techniques.  
Using automated exposure control and adjustment of the mA and/or kV according to patient size, radiat
ion dose was kept as low as reasonably achievable to obtain optimal diagnostic quality images.  DICOM
 format image data is available electronically for review and comparison.  

 

FINDINGS:     

 

LUNGS:     

There is no consolidation or pneumothorax.  No concerning pulmonary nodule is visualized.  No pleural
 fluid is present.

 

MEDIASTINUM:     

No abnormally enlarged lymph nodes by CT criteria. No axillary or hilar abnormalities are identified.
 Heart demonstrates no abnormality. 

 

ABDOMEN:     

There is been prior cholecystectomy. The liver, adrenal glands, kidneys, spleen, and pancreas demonst
rate no significant abnormality. There is no free air or free fluid.

 

PELVIS:     

There is a cystic lesion in the left pelvis measuring 5.8 cm. It likely arises from the left ovary. B
ladder demonstrates no abnormality.

 

THORACIC AORTA:       

The thoracic aortic root is normal with normal branching of the great vessels.  There is no evidence 
of aneurysm or dissection. 

 

ABDOMINAL AORTA:     

The aorta is normal in caliber without aneurysm or dissection.  The renal arteries are patent bilater
ally.  The proximal celiac and superior mesenteric arteries are patent and normal in diameter.   

 

PELVIC VESSELS:     

The internal iliac and external iliac vessels are patent without aneurysm or stenosis.

 

CONCLUSION:     

1. No acute finding is identified within the abdomen or pelvis. There is no aneurysm or dissection.

2. There is a 5.8 cm left adnexal cystic lesion that appears simple. This likely arises from the left
 ovary given the anatomic location. Given the size suggest [facility]
ely 6-8 weeks to confirm resolution.

 

 

 

 Tejinder Bonilla MD on 2018 at 2:18           

Board Certified Radiologist.

 This report was verified electronically.

## 2018-02-06 NOTE — PD
HPI


.


Chest pain


Chief Complaint:  Chest Pain


Time Seen by Provider:  23:08


Travel History


International Travel<30 days:  No


Contact w/Intl Traveler<30days:  No


Traveled to known affect area:  No





History of Present Illness


HPI


37-year-old female complains of relatively sudden onset substernal chest pain 

radiating to right shoulder and right arm, then her left shoulder and left arm, 

followed by her upper back that this evening.  Pain is described as being dull 

ache like squeezing type pain, patient clenching her fist in front of her chest 

to describe the pain.  Further history taking is the patient has had pain in 

her back over the past 2 weeks which is been worse upon lying down.  Patient 

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  Patient 

denies any shortness of breath or change in exercise tolerance lately.  Denies 

any significant leg pain leg swelling, no sedentary period or confounding 

travel of late.





PFSH


Past Medical History


*** Narrative Medical


Past medical history reviewed


Anemia:  Yes


Arthritis:  Yes (L HAND)


Anxiety:  Yes


Diminished Hearing:  No


Pregnant?:  Not Pregnant


:  2


Ovarian Cysts:  Yes





Past Surgical History


 Section:  Yes (2)


Cholecystectomy:  Yes


Gynecologic Surgery:  Yes ()


Tonsillectomy:  Yes





Social History


Alcohol Use:  Yes (OCCASIONALLY)


Tobacco Use:  Yes (1/2 PPD)


Substance Use:  No





Allergies-Medications


(Allergen,Severity, Reaction):  


Coded Allergies:  


     diphenhydramine (Unverified  Allergy, Unknown, 18)


Reported Meds & Prescriptions





Reported Meds & Active Scripts


Active


Mobic (Meloxicam) 15 Mg Tab 15 Mg PO DAILY PRN





Narrative Medication


Allergies and medications reviewed





Review of Systems


Except as stated in HPI:  all other systems reviewed are Neg


General / Constitutional:  No: Fever


Eyes:  No: Visual changes


HENT:  No: Headaches


Cardiovascular:  Positive: Chest Pain or Discomfort


Respiratory:  No: Shortness of Breath


Gastrointestinal:  No: Abdominal Pain


Genitourinary:  No: Dysuria


Musculoskeletal:  No: Pain


Skin:  No Rash


Neurologic:  No: Weakness


Psychiatric:  No: Depression


Endocrine:  No: Polydipsia


Hematologic/Lymphatic:  No: Easy Bruising





Physical Exam


Narrative


GENERAL: Awake alert oriented 3 no acute distress.  Vital signs patient had 

notable hypertension from triage at 220/124, repeated, normalized without 

treatment.  Patient is morbidly obese


SKIN: Warm and dry.  Color is normal no diaphoresis cyanosis or pallor


HEAD: Atraumatic. Normocephalic. 


EYES: Pupils equal and round. No scleral icterus. No injection or drainage. 


ENT: No nasal bleeding or discharge.  Mucous membranes pink and moist.


NECK: Trachea midline. No JVD.  Supple full range of motion


CARDIOVASCULAR: Regular rate and rhythm.  S1-S2 no murmurs rubs or gallops


RESPIRATORY: No accessory muscle use. Clear to auscultation. Breath sounds 

equal bilaterally. 


GASTROINTESTINAL: Abdomen soft, non-tender, nondistended. Hepatic and splenic 

margins not palpable. 


MUSCULOSKELETAL: Extremities without clubbing, cyanosis, or edema. No obvious 

deformities. 


NEUROLOGICAL: Awake and alert. No obvious cranial nerve deficits.  Motor 

grossly within normal limits. Five out of 5 muscle strength in the arms and 

legs.  Normal speech.


PSYCHIATRIC: Appropriate mood and affect; insight and judgment normal.





Data


Data


Last Documented VS





Vital Signs








  Date Time  Temp Pulse Resp B/P (MAP) Pulse Ox O2 Delivery O2 Flow Rate FiO2


 


18 03:07  87 18 115/57 (76) 100   


 


18 22:22 98.7       








Orders





 Orders


Electrocardiogram (18 22:41)


Complete Blood Count With Diff (18 22:41)


Basic Metabolic Panel (Bmp) (18 22:41)


Ckmb (Isoenzyme) Profile (18 22:41)


Troponin I (18 22:41)


Chest, Single Ap (18 22:41)


D-Dimer (18 23:14)


Alprazolam (Xanax) (18 23:15)


Nitroglycerin 2% Oint (Nitroglycerin 2% (18 00:00)


Cta Thor Abd Aorta W Iv C W3d (18 )


Iohexol 350 Inj (Omnipaque 350 Inj) (18 22:19)


Sodium Chloride 0.9% Flush (Ns Flush) (18 02:15)


Al-Mag Hy-Si 40-40-4 Mg/Ml Liq (Mag-Al P (18 02:15)


Lidocaine 2% Viscous (Xylocaine 2% Visco (18 02:15)


Troponin I (18 03:07)


Aspirin Chew (Aspirin Chew) (18 04:15)


Heparin Inj (Heparin Inj) (18 04:15)


Heparin-D5w 25,000 U/250 Ml (Heparin-D5w (18 04:15)


Cbc No Diff, Includes Plts (18 06:00)


Act Partial Throm Time (Ptt) (18 11:15)


Occult Blood (Hemoccult) Stool (18 04:15)


Clopidogrel (Plavix) (18 04:30)





Labs





Laboratory Tests








Test


  18


23:39 18


23:42 18


02:45


 


White Blood Count 12.7 TH/MM3   


 


Red Blood Count 4.43 MIL/MM3   


 


Hemoglobin 8.6 GM/DL   


 


Hematocrit 27.3 %   


 


Mean Corpuscular Volume 61.7 FL   


 


Mean Corpuscular Hemoglobin 19.3 PG   


 


Mean Corpuscular Hemoglobin


Concent 31.3 % 


  


  


 


 


Red Cell Distribution Width 18.3 %   


 


Platelet Count 398 TH/MM3   


 


Mean Platelet Volume 6.7 FL   


 


Neutrophils (%) (Auto) 69.7 %   


 


Lymphocytes (%) (Auto) 22.1 %   


 


Monocytes (%) (Auto) 4.8 %   


 


Eosinophils (%) (Auto) 2.7 %   


 


Basophils (%) (Auto) 0.7 %   


 


Neutrophils # (Auto) 8.9 TH/MM3   


 


Lymphocytes # (Auto) 2.8 TH/MM3   


 


Monocytes # (Auto) 0.6 TH/MM3   


 


Eosinophils # (Auto) 0.3 TH/MM3   


 


Basophils # (Auto) 0.1 TH/MM3   


 


CBC Comment DIFF FINAL   


 


Differential Comment    


 


Blood Urea Nitrogen 9 MG/DL   


 


Creatinine 0.70 MG/DL   


 


Random Glucose 105 MG/DL   


 


Calcium Level 8.4 MG/DL   


 


Sodium Level 140 MEQ/L   


 


Potassium Level 3.9 MEQ/L   


 


Chloride Level 107 MEQ/L   


 


Carbon Dioxide Level 24.0 MEQ/L   


 


Anion Gap 9 MEQ/L   


 


Estimat Glomerular Filtration


Rate 94 ML/MIN 


  


  


 


 


Total Creatine Kinase 58 U/L   


 


Troponin I 0.06 NG/ML   0.42 NG/ML 


 


D-Dimer Quantitative (PE/DVT)  0.37 MG/L FEU  











MDM


Medical Decision Making


Medical Screen Exam Complete:  Yes


Emergency Medical Condition:  Yes


Medical Record Reviewed:  Yes


Differential Diagnosis


Myocardial infarction, aortic dissection, pulmonary embolus, esophagitis, 

pericarditis


Narrative Course


EKG normal sinus rhythm 86 bpm, nonischemic.  Slight IVCD noted in inferior 

limb lead 3 and aVF


Chest x-ray normal


Laboratory examinations, troponin 0.06, 3 hour repeat 0.42.  D-dimer negative 

laboratory examinations otherwise unremarkable


CT aorta performed secondary to patient's pain rating her back and markedly 

elevated blood pressure at presentation.  Negative for acute poor embolus or 

aortic dissection


With conjunction of patient's symptomatology and evolution and cardiac enzymes, 

patient is treated as non-STEMI, started on heparin ACS protocol, aspirin and 

Plavix.


Case discussed with Dr. [facility], admitted





Diagnosis





 Primary Impression:  


 Non-STEMI (non-ST elevated myocardial infarction)





Admitting Information


Admitting Physician Requests:  Admit











Bo Marcelo MD 2018 04:26

## 2018-02-11 NOTE — PQ
Physician Query Response Document

 

 

PATIENT:               MANDY ARANDA

ACCT #:                  [id number]

MRN:                       [id number]

:                       1980

ADMIT DATE:       2018 4:29 AM

DISCH DATE:

RESPONDING

PROVIDER #:        [email or web address]

 

 

QUERY TEXT:

 

Clarification of Clinical Diagnostic Findings

 

Please clarify documentation or clinical relevance for the clinical / diagnostic findings or whether 
those are insignificant or unable to be further specified.

 

PLEASE INDICATE IF THE DOCUMENTED DX OF NSTEMI WAS:

-- Ruled in

-- Ruled out

-- Undetermined

-- Other

 

The patient's Clinical Indicators include:

18 Admission Diagnosis

Non-STEMI

 

PER INITIAL H

EKG shows normal sinus rhythm, pulse 86, no ST segment elevations or depressions, personally reviewed


Initial troponin 0.06, repeat 0.42

Heparin drip

 

PER 18 CARDIAC CATH REPORT -  CONCLUSIONS

1. Angiographically normal coronary arteries.

2. Normal left-sided filling pressures.

 

CARDIOLOGIST STATES - Troponin elevation is likely secondary to coronary vasospasm with Prinzmetal

angina as her symptoms were suggested.  There is no evidence for spontaneous

coronary dissection, anomalous coronary artery, or fixed obstructive coronary

disease.

 

 

 

 

Query created by: Vonnie Martins on 2018 1:04 PM

 

RESPONSE TEXT:

 

Patient with Prinzmetal angina, s/p cardiac cath shows angiographically normal coronary arteries, als
o normal pressure filling of left ventricle

 

 

 

 

 

 

 

Electronically signed by:  Chanel Villegas MD 2018 1:22 PM

## 2018-05-18 ENCOUNTER — HOSPITAL ENCOUNTER (OUTPATIENT)
Dept: HOSPITAL 17 - NEPE | Age: 38
Setting detail: OBSERVATION
LOS: 5 days | Discharge: HOME | End: 2018-05-23
Attending: HOSPITALIST | Admitting: HOSPITALIST
Payer: SELF-PAY

## 2018-05-18 VITALS
HEART RATE: 94 BPM | DIASTOLIC BLOOD PRESSURE: 59 MMHG | SYSTOLIC BLOOD PRESSURE: 130 MMHG | OXYGEN SATURATION: 94 % | TEMPERATURE: 98.5 F | RESPIRATION RATE: 18 BRPM

## 2018-05-18 VITALS — WEIGHT: 220.46 LBS | BODY MASS INDEX: 43.28 KG/M2 | HEIGHT: 60 IN

## 2018-05-18 DIAGNOSIS — M17.0: ICD-10-CM

## 2018-05-18 DIAGNOSIS — I25.10: ICD-10-CM

## 2018-05-18 DIAGNOSIS — I77.6: ICD-10-CM

## 2018-05-18 DIAGNOSIS — D64.9: ICD-10-CM

## 2018-05-18 DIAGNOSIS — Z83.3: ICD-10-CM

## 2018-05-18 DIAGNOSIS — L03.115: Primary | ICD-10-CM

## 2018-05-18 DIAGNOSIS — F41.1: ICD-10-CM

## 2018-05-18 DIAGNOSIS — Z72.0: ICD-10-CM

## 2018-05-18 DIAGNOSIS — A46: ICD-10-CM

## 2018-05-18 DIAGNOSIS — I25.2: ICD-10-CM

## 2018-05-18 LAB
ALBUMIN SERPL-MCNC: 3.9 GM/DL (ref 3.4–5)
ALP SERPL-CCNC: 99 U/L (ref 45–117)
ALT SERPL-CCNC: 27 U/L (ref 10–53)
AST SERPL-CCNC: 20 U/L (ref 15–37)
BASOPHILS # BLD AUTO: 0 TH/MM3 (ref 0–0.2)
BASOPHILS NFR BLD AUTO: 1 % (ref 0–2)
BASOPHILS NFR BLD: 0.4 % (ref 0–2)
BILIRUB SERPL-MCNC: 0.6 MG/DL (ref 0.2–1)
BUN SERPL-MCNC: 15 MG/DL (ref 7–18)
CALCIUM SERPL-MCNC: 9.3 MG/DL (ref 8.5–10.1)
CHLORIDE SERPL-SCNC: 102 MEQ/L (ref 98–107)
CREAT SERPL-MCNC: 1.05 MG/DL (ref 0.5–1)
DOHLE BOD BLD QL SMEAR: PRESENT
EOSINOPHIL # BLD: 0.1 TH/MM3 (ref 0–0.4)
EOSINOPHIL NFR BLD: 0.9 % (ref 0–4)
ERYTHROCYTE [DISTWIDTH] IN BLOOD BY AUTOMATED COUNT: 32 % (ref 11.6–17.2)
GFR SERPLBLD BASED ON 1.73 SQ M-ARVRAT: 59 ML/MIN (ref 89–?)
GLUCOSE SERPL-MCNC: 103 MG/DL (ref 74–106)
HCO3 BLD-SCNC: 22.5 MEQ/L (ref 21–32)
HCT VFR BLD CALC: 37.6 % (ref 35–46)
HGB BLD-MCNC: 11.8 GM/DL (ref 11.6–15.3)
LYMPHOCYTES # BLD AUTO: 1.5 TH/MM3 (ref 1–4.8)
LYMPHOCYTES NFR BLD AUTO: 15.8 % (ref 9–44)
LYMPHOCYTES: 10 % (ref 9–44)
MCH RBC QN AUTO: 22.7 PG (ref 27–34)
MCHC RBC AUTO-ENTMCNC: 31.5 % (ref 32–36)
MCV RBC AUTO: 72 FL (ref 80–100)
MONOCYTE #: 0.6 TH/MM3 (ref 0–0.9)
MONOCYTES NFR BLD: 6.2 % (ref 0–8)
MONOCYTES: 4 % (ref 0–8)
NEUTROPHILS # BLD AUTO: 7.1 TH/MM3 (ref 1.8–7.7)
NEUTROPHILS NFR BLD AUTO: 76.7 % (ref 16–70)
NEUTS BAND # BLD MANUAL: 7.5 TH/MM3 (ref 1.8–7.7)
NEUTS BAND NFR BLD: 13 % (ref 0–6)
NEUTS SEG NFR BLD MANUAL: 69 % (ref 16–70)
OVALOCYTES BLD QL SMEAR: (no result)
PLATELET # BLD: 279 TH/MM3 (ref 150–450)
PMV BLD AUTO: 7.4 FL (ref 7–11)
PROT SERPL-MCNC: 8.6 GM/DL (ref 6.4–8.2)
RBC # BLD AUTO: 5.22 MIL/MM3 (ref 4–5.3)
SODIUM SERPL-SCNC: 135 MEQ/L (ref 136–145)
WBC # BLD AUTO: 9.2 TH/MM3 (ref 4–11)

## 2018-05-18 PROCEDURE — 99285 EMERGENCY DEPT VISIT HI MDM: CPT

## 2018-05-18 PROCEDURE — 86038 ANTINUCLEAR ANTIBODIES: CPT

## 2018-05-18 PROCEDURE — 86140 C-REACTIVE PROTEIN: CPT

## 2018-05-18 PROCEDURE — 85652 RBC SED RATE AUTOMATED: CPT

## 2018-05-18 PROCEDURE — 96372 THER/PROPH/DIAG INJ SC/IM: CPT

## 2018-05-18 PROCEDURE — 85025 COMPLETE CBC W/AUTO DIFF WBC: CPT

## 2018-05-18 PROCEDURE — 96361 HYDRATE IV INFUSION ADD-ON: CPT

## 2018-05-18 PROCEDURE — 80048 BASIC METABOLIC PNL TOTAL CA: CPT

## 2018-05-18 PROCEDURE — 96375 TX/PRO/DX INJ NEW DRUG ADDON: CPT

## 2018-05-18 PROCEDURE — 85007 BL SMEAR W/DIFF WBC COUNT: CPT

## 2018-05-18 PROCEDURE — G0378 HOSPITAL OBSERVATION PER HR: HCPCS

## 2018-05-18 PROCEDURE — 84481 FREE ASSAY (FT-3): CPT

## 2018-05-18 PROCEDURE — 80053 COMPREHEN METABOLIC PANEL: CPT

## 2018-05-18 PROCEDURE — 86430 RHEUMATOID FACTOR TEST QUAL: CPT

## 2018-05-18 PROCEDURE — 87040 BLOOD CULTURE FOR BACTERIA: CPT

## 2018-05-18 PROCEDURE — 85027 COMPLETE CBC AUTOMATED: CPT

## 2018-05-18 PROCEDURE — 96376 TX/PRO/DX INJ SAME DRUG ADON: CPT

## 2018-05-18 PROCEDURE — 96366 THER/PROPH/DIAG IV INF ADDON: CPT

## 2018-05-18 PROCEDURE — 96365 THER/PROPH/DIAG IV INF INIT: CPT

## 2018-05-18 PROCEDURE — 84443 ASSAY THYROID STIM HORMONE: CPT

## 2018-05-18 PROCEDURE — 84436 ASSAY OF TOTAL THYROXINE: CPT

## 2018-05-18 NOTE — PD
HPI


Chief Complaint:  Edema


Time Seen by Provider:  20:41


Travel History


International Travel<30 days:  No


Contact w/Intl Traveler<30days:  No


Traveled to known affect area:  No





History of Present Illness


HPI


Patient is a 37-year-old female who is got 2 days of redness and warmth 

tenderness of her right lower ankle it started in her foot instep as well as 

the lower anterior tib-fib area it is painful it is red it is warm she denies 

any injury she denies any trauma she has a tattoo there but that has been there 

for years she is not a diabetic only past medical history is that she has had a 

MI a year ago angioplasty with no stents she has not taken any medication for 

this it is pressure-like pain it is warm it is burning it is localized to the 

distal tib-fib and foot right-sided





PFSH


Past Medical History


Anemia:  Yes


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


Anxiety:  Yes


Depression:  No


Cancer:  No


Cardiovascular Problems:  Yes


Cerebrovascular Accident:  No


Coronary Artery Disease:  Yes (AMI )


Diminished Hearing:  No


Endocrine:  No


Genitourinary:  No


Headaches:  Yes


Immune Disorder:  No


Musculoskeletal:  Yes


Neurologic:  Yes


Psychiatric:  Yes


Reproductive:  Yes (ENDOMETRIOSIS)


Respiratory:  Yes


Migraines:  Yes


Seizures:  No


Tetanus Vaccination:  Unknown


Pregnant?:  Not Pregnant


LMP:  1 MONTH AGO


:  2


Ovarian Cysts:  Yes


Tubal Ligation:  Yes





Past Surgical History


Abdominal Surgery:  Yes (CSECTIONS, GALLBLADDER)


Cardiac Surgery:  No


 Section:  Yes (2)


Cholecystectomy:  Yes


Ear Surgery:  No


Endocrine Surgery:  No


Eye Surgery:  No


Genitourinary Surgery:  No


Gynecologic Surgery:  Yes ()


Oral Surgery:  No (EXTRACTION)


Thoracic Surgery:  No


Tonsillectomy:  Yes


Other Surgery:  Yes (CSECTION, GALLBLADDER, TONSILS)





Social History


Alcohol Use:  Yes (OCCASIONALLY)


Tobacco Use:  Yes (1/2 PPD)


Substance Use:  No





Allergies-Medications


(Allergen,Severity, Reaction):  


Coded Allergies:  


     diphenhydramine (Unverified  Allergy, Unknown, 2/15/18)


Reported Meds & Prescriptions





Reported Meds & Active Scripts


Active


Aspirin DR (Aspirin) 81 Mg Tabdr 81 Mg PO DAILY


Mobic (Meloxicam) 15 Mg Tab 15 Mg PO DAILY PRN


Reported


Ferrous Sulfate 325 Mg (65 Mg Iron) Tablet 325 Mg PO TIDPC


Metoprolol Tartrate 25 Mg Tab 12.5 Mg PO BID


Atorvastatin (Atorvastatin Calcium) 20 Mg Tab 20 Mg PO HS


Diclofenac Sodium DR (Diclofenac Sodium) 75 Mg Tabdr 75 Mg PO BID








Review of Systems


Except as stated in HPI:  all other systems reviewed are Neg


General / Constitutional:  Positive: Fever


Skin:  Positive Rash, Positive Change in Pigmentation





Physical Exam


Narrative


GENERAL: 


SKIN: Warm and dry.


HEAD: Atraumatic. Normocephalic. 


EYES: Pupils equal and round. No scleral icterus. No injection or drainage. 


ENT: No nasal bleeding or discharge.  Mucous membranes pink and moist.


NECK: Trachea midline. No JVD. 


CARDIOVASCULAR: Regular rate and rhythm.  


RESPIRATORY: No accessory muscle use. Clear to auscultation. Breath sounds 

equal bilaterally. 


GASTROINTESTINAL: Abdomen soft, non-tender, nondistended. Hepatic and splenic 

margins not palpable. 


MUSCULOSKELETAL: Extremities   right  lower  tibial area petechial  and  red  

erythema  tender  and  warmth ,  appears  cellulitis  over possible  vasculitis


NEUROLOGICAL: Awake and alert. No obvious cranial nerve deficits.  Motor 

grossly within normal limits. Five out of 5 muscle strength in the arms and 

legs.  Normal speech.


PSYCHIATRIC: Appropriate mood and affect; insight and judgment normal.





Data


Data


Last Documented VS





Orders





 Orders


Vancomycin Inj (Vancomycin Inj) (18 21:30)


Complete Blood Count With Diff (18 21:30)


Comprehensive Metabolic Panel (18 21:30)


Blood Culture (18 21:30)


Ketorolac Inj (Toradol Inj) (18 22:30)


Ketorolac Inj (Toradol Inj) (18 22:30)


Morphine Inj (Morphine Inj) (18 22:30)


Morphine Inj (Morphine Inj) (18 23:45)


Westergren Sedimentation Rate (18 00:49)


C-Reactive Protein (Crp) (18 00:49)


Admit Order (Ed Use Only) (18 04:58)





Labs





Laboratory Tests








Test


  18


21:55


 


White Blood Count 9.2 TH/MM3 


 


Red Blood Count 5.22 MIL/MM3 


 


Hemoglobin 11.8 GM/DL 


 


Hematocrit 37.6 % 


 


Mean Corpuscular Volume 72.0 FL 


 


Mean Corpuscular Hemoglobin 22.7 PG 


 


Mean Corpuscular Hemoglobin


Concent 31.5 % 


 


 


Red Cell Distribution Width 32.0 % 


 


Platelet Count 279 TH/MM3 


 


Mean Platelet Volume 7.4 FL 


 


Neutrophils (%) (Auto) 76.7 % 


 


Lymphocytes (%) (Auto) 15.8 % 


 


Monocytes (%) (Auto) 6.2 % 


 


Eosinophils (%) (Auto) 0.9 % 


 


Basophils (%) (Auto) 0.4 % 


 


Neutrophils # (Auto) 7.1 TH/MM3 


 


Lymphocytes # (Auto) 1.5 TH/MM3 


 


Monocytes # (Auto) 0.6 TH/MM3 


 


Eosinophils # (Auto) 0.1 TH/MM3 


 


Basophils # (Auto) 0.0 TH/MM3 


 


CBC Comment AUTO DIFF 


 


Differential Total Cells


Counted 100 


 


 


Neutrophils % (Manual) 69 % 


 


Band Neutrophils % 13 % 


 


Lymphocytes % 10 % 


 


Monocytes % 4 % 


 


Eosinophils % 3 % 


 


Basophils % 1 % 


 


Neutrophils # (Manual) 7.5 TH/MM3 


 


Differential Comment


  FINAL DIFF


MANUAL


 


Dohle Bodies PRESENT 


 


Platelet Estimate NORMAL 


 


Platelet Morphology Comment NORMAL 


 


Ovalocytes 1+ 


 


Erythrocyte Sedimentation Rate 69 mm/hr 


 


Blood Urea Nitrogen 15 MG/DL 


 


Creatinine 1.05 MG/DL 


 


Random Glucose 103 MG/DL 


 


Total Protein 8.6 GM/DL 


 


Albumin 3.9 GM/DL 


 


Calcium Level 9.3 MG/DL 


 


Alkaline Phosphatase 99 U/L 


 


Aspartate Amino Transf


(AST/SGOT) 20 U/L 


 


 


Alanine Aminotransferase


(ALT/SGPT) 27 U/L 


 


 


Total Bilirubin 0.6 MG/DL 


 


Sodium Level 135 MEQ/L 


 


Potassium Level 3.2 MEQ/L 


 


Chloride Level 102 MEQ/L 


 


Carbon Dioxide Level 22.5 MEQ/L 


 


Anion Gap 11 MEQ/L 


 


Estimat Glomerular Filtration


Rate 59 ML/MIN 


 


 


C-Reactive Protein 21.40 MG/DL 











[facility]


Medical Decision Making


Medical Screen Exam Complete:  Yes


Emergency Medical Condition:  Yes


Differential Diagnosis


cellulitis  vs  vasculitis  with  suprainfection  vs   other  trauma   venous 

insufficiency  other


Narrative Course


cellulitis  treated with  Vanco 1 gr in  ER   Redness has   appearance which 

could also be  vasculitis,  ESR  elevated and  wbc  12  and  CRP  elevated as 

well I will let  inpt decide if  steroid indicated





Diagnosis





 Primary Impression:  


 Cellulitis


Scripts


Hydrocodone/Acetaminophen (Hydrocodone-Acetamin  mg) 10 Mg-325 Mg Tablet


1 TAB PO Q6H Y for PAIN SCALE 6 TO 10, #12 TAB


   Prov: Hong Johnston MD         18











Isaías Greenfield MD May 18, 2018 21:33

## 2018-05-19 VITALS
OXYGEN SATURATION: 99 % | TEMPERATURE: 98.8 F | SYSTOLIC BLOOD PRESSURE: 116 MMHG | DIASTOLIC BLOOD PRESSURE: 58 MMHG | HEART RATE: 86 BPM | RESPIRATION RATE: 20 BRPM

## 2018-05-19 VITALS
HEART RATE: 85 BPM | OXYGEN SATURATION: 97 % | SYSTOLIC BLOOD PRESSURE: 123 MMHG | DIASTOLIC BLOOD PRESSURE: 58 MMHG | TEMPERATURE: 99.1 F | RESPIRATION RATE: 24 BRPM

## 2018-05-19 VITALS
DIASTOLIC BLOOD PRESSURE: 69 MMHG | RESPIRATION RATE: 14 BRPM | OXYGEN SATURATION: 98 % | SYSTOLIC BLOOD PRESSURE: 136 MMHG | HEART RATE: 71 BPM

## 2018-05-19 VITALS
SYSTOLIC BLOOD PRESSURE: 142 MMHG | OXYGEN SATURATION: 99 % | RESPIRATION RATE: 14 BRPM | HEART RATE: 68 BPM | DIASTOLIC BLOOD PRESSURE: 71 MMHG

## 2018-05-19 VITALS
RESPIRATION RATE: 18 BRPM | DIASTOLIC BLOOD PRESSURE: 58 MMHG | OXYGEN SATURATION: 97 % | SYSTOLIC BLOOD PRESSURE: 126 MMHG | HEART RATE: 80 BPM | TEMPERATURE: 99.2 F

## 2018-05-19 VITALS
DIASTOLIC BLOOD PRESSURE: 50 MMHG | HEART RATE: 98 BPM | SYSTOLIC BLOOD PRESSURE: 93 MMHG | OXYGEN SATURATION: 97 % | RESPIRATION RATE: 18 BRPM

## 2018-05-19 VITALS
TEMPERATURE: 99 F | RESPIRATION RATE: 20 BRPM | HEART RATE: 83 BPM | SYSTOLIC BLOOD PRESSURE: 117 MMHG | DIASTOLIC BLOOD PRESSURE: 68 MMHG | OXYGEN SATURATION: 98 %

## 2018-05-19 VITALS
TEMPERATURE: 98.9 F | SYSTOLIC BLOOD PRESSURE: 131 MMHG | OXYGEN SATURATION: 99 % | HEART RATE: 84 BPM | RESPIRATION RATE: 16 BRPM | DIASTOLIC BLOOD PRESSURE: 65 MMHG

## 2018-05-19 VITALS
RESPIRATION RATE: 18 BRPM | SYSTOLIC BLOOD PRESSURE: 126 MMHG | OXYGEN SATURATION: 96 % | DIASTOLIC BLOOD PRESSURE: 60 MMHG | TEMPERATURE: 98.4 F | HEART RATE: 61 BPM

## 2018-05-19 RX ADMIN — HYDROCODONE BITARTRATE AND ACETAMINOPHEN PRN TAB: 10; 325 TABLET ORAL at 15:49

## 2018-05-19 RX ADMIN — SODIUM CHLORIDE SCH MLS/HR: 900 INJECTION INTRAVENOUS at 11:16

## 2018-05-19 RX ADMIN — PHENYTOIN SODIUM SCH MLS/HR: 50 INJECTION INTRAMUSCULAR; INTRAVENOUS at 07:25

## 2018-05-19 RX ADMIN — Medication SCH ML: at 09:25

## 2018-05-19 RX ADMIN — HYDROCODONE BITARTRATE AND ACETAMINOPHEN PRN TAB: 10; 325 TABLET ORAL at 21:31

## 2018-05-19 RX ADMIN — Medication SCH ML: at 21:36

## 2018-05-19 RX ADMIN — METHYLPREDNISOLONE SODIUM SUCCINATE SCH MG: 40 INJECTION, POWDER, FOR SOLUTION INTRAMUSCULAR; INTRAVENOUS at 21:35

## 2018-05-19 RX ADMIN — STANDARDIZED SENNA CONCENTRATE AND DOCUSATE SODIUM SCH TAB: 8.6; 5 TABLET, FILM COATED ORAL at 09:25

## 2018-05-19 RX ADMIN — HYDROCODONE BITARTRATE AND ACETAMINOPHEN PRN TAB: 10; 325 TABLET ORAL at 11:25

## 2018-05-19 RX ADMIN — STANDARDIZED SENNA CONCENTRATE AND DOCUSATE SODIUM SCH TAB: 8.6; 5 TABLET, FILM COATED ORAL at 21:30

## 2018-05-19 RX ADMIN — CEFEPIME SCH MLS/HR: 1 INJECTION, POWDER, FOR SOLUTION INTRAMUSCULAR; INTRAVENOUS at 06:43

## 2018-05-19 RX ADMIN — PHENYTOIN SODIUM SCH MLS/HR: 50 INJECTION INTRAMUSCULAR; INTRAVENOUS at 15:25

## 2018-05-19 RX ADMIN — HYDROCODONE BITARTRATE AND ACETAMINOPHEN PRN TAB: 10; 325 TABLET ORAL at 07:24

## 2018-05-19 RX ADMIN — ENOXAPARIN SODIUM SCH MG: 40 INJECTION SUBCUTANEOUS at 14:34

## 2018-05-19 RX ADMIN — CEFEPIME SCH MLS/HR: 1 INJECTION, POWDER, FOR SOLUTION INTRAMUSCULAR; INTRAVENOUS at 17:42

## 2018-05-19 NOTE — HHI.HP
__________________________________________________





[facility]


Service


[facility]


Primary Care Physician


Connie West MD


Admission Diagnosis





vasculitis Cellulitis


Diagnoses:  


Travel History


International Travel<30 Days:  No


Contact w/Intl Traveler <30 Da:  No


Traveled to Known Affected Are:  No


History of Present Illness


Mrs. Godoy is a 37 year old female.  She has a history of right lower 

extremity redness and swelling.  This has been progressive over the last 2 

days.  Increased swelling, redness, and pain have been a problem.  She decided 

to come in the emergency department due to the progression.  No fevers 

reported.  She has no previous history of this.  No other areas of the body are 

affected.





Review of Systems


Constitutional:  DENIES: Fatigue, Fever, Chills


Eyes:  DENIES: Diplopia, Eye inflammation, Eye pain


Ears, nose, mouth, throat:  DENIES: Hearing loss, Vertigo, Nasal discharge


Respiratory:  DENIES: Apneas, Cough, Snoring, Wheezing, Shortness of breath


Cardiovascular:  DENIES: Chest pain, Palpitations, Syncope


Gastrointestinal:  DENIES: Abdominal pain, Black stools, Bloody stools


Musculoskeletal:  DENIES: Joint pain, Muscle aches, Stiffness, Joint Swelling


Integumentary:  DENIES: Abnormal pigmentation, Pruritus, Rash, Nail changes


Hematologic/lymphatic:  DENIES: Bruising, Lymphadenopathy


Immunologic/allergic:  DENIES: Eczema, Urticaria


Neurologic:  DENIES: Abnormal gait, Headache, Paresthesias


Psychiatric:  DENIES: Anxiety, Confusion, Hallucinations





Past Family Social History


Past Medical History


Chronic Anemia


Osteoarthritis


General Anxiety Disorder


CAD


Old MI


Hx of Headaches


Endometriosis


Ovarian Cysts


Past Surgical History





Cholecystectomy


Tooth Extraction


Tonsillectomy





Reported Medications





Reported Meds & Active Scripts


Active


Aspirin DR (Aspirin) 81 Mg Tabdr 81 Mg PO DAILY


Mobic (Meloxicam) 15 Mg Tab 15 Mg PO DAILY PRN


Reported


Ferrous Sulfate 325 Mg (65 Mg Iron) Tablet 325 Mg PO TIDPC


Metoprolol Tartrate 25 Mg Tab 12.5 Mg PO BID


Atorvastatin (Atorvastatin Calcium) 20 Mg Tab 20 Mg PO HS


Diclofenac Sodium DR (Diclofenac Sodium) 75 Mg Tabdr 75 Mg PO BID


Allergies:  


Coded Allergies:  


     diphenhydramine (Unverified  Allergy, Unknown, 2/15/18)


Active Ordered Medications





Administered Medications








 Medications


  (Trade)  Dose


 Ordered  Sig/Ronnell


 Route


 PRN Reason  Start Time


 Stop Time Status Last Admin


Dose Admin


 


 Cefepime HCl 1000


 mg/Sodium Chloride  100 ml @ 


 200 mls/hr  Q12H


 IV


   18 06:00


    18 06:43


 


 


 Sodium Chloride  1,000 ml @ 


 100 mls/hr  Q10H


 IV


   18 05:09


    18 07:25


 


 


 Sodium Chloride


  (NS Flush)  2 ml  BID


 IV FLUSH


   18 09:00


    18 09:25


 


 


 Acetaminophen/


 Hydrocodone Bitart


  (Norco  Mg)  1 tab  Q4H  PRN


 PO


 PAIN SCALE 6 TO 10  18 05:15


    18 11:25


 


 


 Senna/Docusate


 Sodium


  (Debra-Colace)  1 tab  BID


 PO


   18 09:00


    18 09:25


 


 


 Vancomycin HCl


 1500 mg/Sodium


 Chloride  515 ml @ 


 250 mls/hr  Q18H


 IV


   18 11:00


    18 11:16


 








Family History


Maternal Grandmother:  DM2, Cancer NOS


Social History


Alcohol Use:  Yes (OCCASIONALLY)


Tobacco Use:  Yes (1/2 PPD)


Substance Use:  No





Physical Exam


Vital Signs





Vital Signs








  Date Time  Temp Pulse Resp B/P (MAP) Pulse Ox O2 Delivery O2 Flow Rate FiO2


 


18 11:54 98.8 86 20 116/58 (77) 99   


 


18 08:32 99.1 85 24 123/58 (79) 97   


 


18 08:21     (64)    


 


18 07:59  98 18 93/50 (64) 97 Room Air  


 


18 06:06 98.9 84 16 131/65 (87) 99 Room Air  


 


18 04:00  71 14 136/69 (91) 98 Room Air  


 


18 00:34   16     


 


18 00:34   16     


 


18 00:00  68 14 142/71 (94) 99 Room Air  


 


18 17:14 98.5 94 18 130/59 (82) 94   








Physical Exam


GENERAL: NAD, A&Ox3


HEAD: Normocephalic. 


NECK: Supple, trachea midline. No lymphadenopathy.


EYES: No scleral icterus. No injection or drainage. 


CARDIOVASCULAR: Regular rate and rhythm without murmurs, gallops, or rubs. 


RESPIRATORY: Breath sounds equal bilaterally. No accessory muscle use.


GASTROINTESTINAL: Abdomen soft, non-tender, nondistended. 


MUSCULOSKELETAL: No cyanosis, or edema. 


SKIN: Warm and dry.  Middle one half of the right lower extremity has cherry-

red erythema with swelling and induration below the affected area.  Patchy area 

of erythema is present at the right medial foot.


NEURO:  No focal neurological deficitis.


Laboratory





Laboratory Tests








Test


  18


21:55


 


White Blood Count 9.2 


 


Red Blood Count 5.22 


 


Hemoglobin 11.8 


 


Hematocrit 37.6 


 


Mean Corpuscular Volume 72.0 


 


Mean Corpuscular Hemoglobin 22.7 


 


Mean Corpuscular Hemoglobin


Concent 31.5 


 


 


Red Cell Distribution Width 32.0 


 


Platelet Count 279 


 


Mean Platelet Volume 7.4 


 


Neutrophils (%) (Auto) 76.7 


 


Lymphocytes (%) (Auto) 15.8 


 


Monocytes (%) (Auto) 6.2 


 


Eosinophils (%) (Auto) 0.9 


 


Basophils (%) (Auto) 0.4 


 


Neutrophils # (Auto) 7.1 


 


Lymphocytes # (Auto) 1.5 


 


Monocytes # (Auto) 0.6 


 


Eosinophils # (Auto) 0.1 


 


Basophils # (Auto) 0.0 


 


CBC Comment AUTO DIFF 


 


Differential Total Cells


Counted 100 


 


 


Neutrophils % (Manual) 69 


 


Band Neutrophils % 13 


 


Lymphocytes % 10 


 


Monocytes % 4 


 


Eosinophils % 3 


 


Basophils % 1 


 


Neutrophils # (Manual) 7.5 


 


Differential Comment


  FINAL DIFF


MANUAL


 


Dohle Bodies PRESENT 


 


Platelet Estimate NORMAL 


 


Platelet Morphology Comment NORMAL 


 


Ovalocytes 1+ 


 


Erythrocyte Sedimentation Rate 69 


 


Blood Urea Nitrogen 15 


 


Creatinine 1.05 


 


Random Glucose 103 


 


Total Protein 8.6 


 


Albumin 3.9 


 


Calcium Level 9.3 


 


Alkaline Phosphatase 99 


 


Aspartate Amino Transf


(AST/SGOT) 20 


 


 


Alanine Aminotransferase


(ALT/SGPT) 27 


 


 


Total Bilirubin 0.6 


 


Sodium Level 135 


 


Potassium Level 3.2 


 


Chloride Level 102 


 


Carbon Dioxide Level 22.5 


 


Anion Gap 11 


 


Estimat Glomerular Filtration


Rate 59 


 


 


C-Reactive Protein 21.40 














 Date/Time


Source Procedure


Growth Status


 


 


 18 21:55


Blood Peripheral Aerobic Blood Culture - Preliminary


NO GROWTH IN 1 DAY Resulted


 


 18 21:55


Blood Peripheral Anaerobic Blood Culture - Preliminary


NO GROWTH IN 1 DAY Resulted








Result Diagram:  


18








Caprini VTE Risk Assessment


Caprini VTE Risk Assessment:  Mod/High Risk (score >= 2)


Caprini Risk Assessment Model











 Point Value = 1          Point Value = 2  Point Value = 3  Point Value = 5


 


Age 41-60


Minor surgery


BMI > 25 kg/m2


Swollen legs


Varicose veins


Pregnancy or postpartum


History of unexplained or recurrent


   spontaneous 


Oral contraceptives or hormone


   replacement


Sepsis (< 1 month)


Serious lung disease, including


   pneumonia (< 1 month)


Abnormal pulmonary function


Acute myocardial infarction


Congestive heart failure (< 1 month)


History of inflammatory bowel disease


Medical patient at bed rest Age 61-74


Arthroscopic surgery


Major open surgery (> 45 min)


Laparoscopic surgery (> 45 min)


Malignancy


Confined to bed (> 72 hours)


Immobilizing plaster cast


Central venous access Age >= 75


History of VTE


Family history of VTE


Factor V Leiden


Prothrombin 88010E


Lupus anticoagulant


Anticardiolipin antibodies


Elevated serum homocysteine


Heparin-induced thrombocytopenia


Other congenital or acquired


   thrombophilia Stroke (< 1 month)


Elective arthroplasty


Hip, pelvis, or leg fracture


Acute spinal cord injury (< 1 month)








Prophylaxis Regimen











   Total Risk


Factor Score Risk Level Prophylaxis Regimen


 


0-1      Low Early ambulation


 


2 Moderate Order ONE of the following:


*Sequential Compression Device (SCD)


*Heparin 5000 units SQ BID


 


3-4 Higher Order ONE of the following medications:


*Heparin 5000 units SQ TID


*Enoxaparin/Lovenox 40 mg SQ daily (WT < 150 kg, CrCl > 30 mL/min)


*Enoxaparin/Lovenox 30 mg SQ daily (WT < 150 kg, CrCl > 10-29 mL/min)


*Enoxaparin/Lovenox 30 mg SQ BID (WT < 150 kg, CrCl > 30 mL/min)


AND/OR


*Sequential Compression Device (SCD)


 


5 or more Highest Order ONE of the following medications:


*Heparin 5000 units SQ TID (Preferred with Epidurals)


*Enoxaparin/Lovenox 40 mg SQ daily (WT < 150 kg, CrCl > 30 mL/min)


*Enoxaparin/Lovenox 30 mg SQ daily (WT < 150 kg, CrCl > 10-29 mL/min)


*Enoxaparin/Lovenox 30 mg SQ BID (WT < 150 kg, CrCl > 30 mL/min)


AND


*Sequential Compression Device (SCD)











Assessment and Plan


Problem List:  


(1) Cellulitis


ICD Code:  L03.90 - Cellulitis, unspecified


(2) Vasculitis


ICD Code:  I77.6 - Arteritis, unspecified


Assessment and Plan


37 year old female admitted with Vasculitis and Cellulitis of RLE.





Right Lower Extremity Cellulitis


Right Lower Extremity Vasculitis


Cover with Vancomycin and Cefepime


ESR and CRP are significantly elevated


Alternate etiology could be autoimmune


Steroids provided for inflammation


RICHARD ordered


RF for autoimmune screen





Chronic Anemia


Follow CBC





CAD


Old MI


Asymptomatic


Follow clinically





DVT Prophylaxis


Lovenox





Physician Certification


2 Midnight Certification Type:  Admission for Inpatient Services


Order for Inpatient Services


The services are ordered in accordance with Medicare regulations or non-

Medicare payer requirements, as applicable.  In the case of services not 

specified as inpatient-only, they are appropriately provided as inpatient 

services in accordance with the 2-midnight benchmark.


Estimated LOS (days):  2


 days is the estimated time the patient will need to remain in the hospital, 

assuming treatment plan goals are met and no additional complications.


Post-Hospital Plan:  Darrin Nagel MD May 19, 2018 13:07

## 2018-05-20 VITALS
DIASTOLIC BLOOD PRESSURE: 62 MMHG | TEMPERATURE: 98.2 F | OXYGEN SATURATION: 98 % | RESPIRATION RATE: 20 BRPM | HEART RATE: 70 BPM | SYSTOLIC BLOOD PRESSURE: 120 MMHG

## 2018-05-20 VITALS
TEMPERATURE: 98.2 F | OXYGEN SATURATION: 96 % | HEART RATE: 90 BPM | SYSTOLIC BLOOD PRESSURE: 119 MMHG | DIASTOLIC BLOOD PRESSURE: 73 MMHG

## 2018-05-20 VITALS
SYSTOLIC BLOOD PRESSURE: 136 MMHG | OXYGEN SATURATION: 97 % | HEART RATE: 70 BPM | TEMPERATURE: 98.5 F | DIASTOLIC BLOOD PRESSURE: 61 MMHG | RESPIRATION RATE: 18 BRPM

## 2018-05-20 VITALS
TEMPERATURE: 99.2 F | DIASTOLIC BLOOD PRESSURE: 60 MMHG | OXYGEN SATURATION: 96 % | HEART RATE: 65 BPM | SYSTOLIC BLOOD PRESSURE: 130 MMHG | RESPIRATION RATE: 18 BRPM

## 2018-05-20 VITALS
RESPIRATION RATE: 18 BRPM | OXYGEN SATURATION: 96 % | TEMPERATURE: 98 F | SYSTOLIC BLOOD PRESSURE: 138 MMHG | DIASTOLIC BLOOD PRESSURE: 64 MMHG | HEART RATE: 65 BPM

## 2018-05-20 LAB
ALBUMIN SERPL-MCNC: 2.9 GM/DL (ref 3.4–5)
ALP SERPL-CCNC: 85 U/L (ref 45–117)
ALT SERPL-CCNC: 24 U/L (ref 10–53)
AST SERPL-CCNC: 13 U/L (ref 15–37)
BASOPHILS # BLD AUTO: 0 TH/MM3 (ref 0–0.2)
BASOPHILS NFR BLD: 0.1 % (ref 0–2)
BILIRUB SERPL-MCNC: 0.3 MG/DL (ref 0.2–1)
BUN SERPL-MCNC: 8 MG/DL (ref 7–18)
CALCIUM SERPL-MCNC: 8.7 MG/DL (ref 8.5–10.1)
CHLORIDE SERPL-SCNC: 109 MEQ/L (ref 98–107)
CREAT SERPL-MCNC: 0.61 MG/DL (ref 0.5–1)
CRP SERPL-MCNC: 17 MG/DL (ref 0–0.3)
EOSINOPHIL # BLD: 0 TH/MM3 (ref 0–0.4)
EOSINOPHIL NFR BLD: 0 % (ref 0–4)
ERYTHROCYTE [DISTWIDTH] IN BLOOD BY AUTOMATED COUNT: 31.6 % (ref 11.6–17.2)
GFR SERPLBLD BASED ON 1.73 SQ M-ARVRAT: 110 ML/MIN (ref 89–?)
GLUCOSE SERPL-MCNC: 127 MG/DL (ref 74–106)
HCO3 BLD-SCNC: 22.6 MEQ/L (ref 21–32)
HCT VFR BLD CALC: 32.7 % (ref 35–46)
HGB BLD-MCNC: 10.1 GM/DL (ref 11.6–15.3)
LYMPHOCYTES # BLD AUTO: 0.8 TH/MM3 (ref 1–4.8)
LYMPHOCYTES NFR BLD AUTO: 6.8 % (ref 9–44)
LYMPHOCYTES: 9 % (ref 9–44)
MCH RBC QN AUTO: 22.8 PG (ref 27–34)
MCHC RBC AUTO-ENTMCNC: 31 % (ref 32–36)
MCV RBC AUTO: 73.6 FL (ref 80–100)
MONOCYTE #: 0.4 TH/MM3 (ref 0–0.9)
MONOCYTES NFR BLD: 3.4 % (ref 0–8)
MONOCYTES: 5 % (ref 0–8)
NEUTROPHILS # BLD AUTO: 10.9 TH/MM3 (ref 1.8–7.7)
NEUTROPHILS NFR BLD AUTO: 89.7 % (ref 16–70)
NEUTS BAND # BLD MANUAL: 10.4 TH/MM3 (ref 1.8–7.7)
NEUTS BAND NFR BLD: 15 % (ref 0–6)
NEUTS SEG NFR BLD MANUAL: 71 % (ref 16–70)
OVALOCYTES BLD QL SMEAR: (no result)
PLATELET # BLD: 234 TH/MM3 (ref 150–450)
PMV BLD AUTO: 7.5 FL (ref 7–11)
PROT SERPL-MCNC: 7.1 GM/DL (ref 6.4–8.2)
RBC # BLD AUTO: 4.45 MIL/MM3 (ref 4–5.3)
RHEUMATOID FACT SER QL LA: NEGATIVE
SODIUM SERPL-SCNC: 139 MEQ/L (ref 136–145)
WBC # BLD AUTO: 12.1 TH/MM3 (ref 4–11)

## 2018-05-20 RX ADMIN — HYDROCODONE BITARTRATE AND ACETAMINOPHEN PRN TAB: 10; 325 TABLET ORAL at 08:46

## 2018-05-20 RX ADMIN — Medication SCH ML: at 08:45

## 2018-05-20 RX ADMIN — STANDARDIZED SENNA CONCENTRATE AND DOCUSATE SODIUM SCH TAB: 8.6; 5 TABLET, FILM COATED ORAL at 22:39

## 2018-05-20 RX ADMIN — CEFEPIME SCH MLS/HR: 1 INJECTION, POWDER, FOR SOLUTION INTRAMUSCULAR; INTRAVENOUS at 17:10

## 2018-05-20 RX ADMIN — CEFEPIME SCH MLS/HR: 1 INJECTION, POWDER, FOR SOLUTION INTRAMUSCULAR; INTRAVENOUS at 06:27

## 2018-05-20 RX ADMIN — HYDROCODONE BITARTRATE AND ACETAMINOPHEN PRN TAB: 10; 325 TABLET ORAL at 13:19

## 2018-05-20 RX ADMIN — HYDROCODONE BITARTRATE AND ACETAMINOPHEN PRN TAB: 10; 325 TABLET ORAL at 22:41

## 2018-05-20 RX ADMIN — ENOXAPARIN SODIUM SCH MG: 40 INJECTION SUBCUTANEOUS at 13:19

## 2018-05-20 RX ADMIN — PHENYTOIN SODIUM SCH MLS/HR: 50 INJECTION INTRAMUSCULAR; INTRAVENOUS at 11:11

## 2018-05-20 RX ADMIN — METHYLPREDNISOLONE SODIUM SUCCINATE SCH MG: 40 INJECTION, POWDER, FOR SOLUTION INTRAMUSCULAR; INTRAVENOUS at 22:40

## 2018-05-20 RX ADMIN — METHYLPREDNISOLONE SODIUM SUCCINATE SCH MG: 40 INJECTION, POWDER, FOR SOLUTION INTRAMUSCULAR; INTRAVENOUS at 08:45

## 2018-05-20 RX ADMIN — PHENYTOIN SODIUM SCH MLS/HR: 50 INJECTION INTRAMUSCULAR; INTRAVENOUS at 01:20

## 2018-05-20 RX ADMIN — HYDROCODONE BITARTRATE AND ACETAMINOPHEN PRN TAB: 10; 325 TABLET ORAL at 17:58

## 2018-05-20 RX ADMIN — Medication SCH ML: at 22:40

## 2018-05-20 RX ADMIN — STANDARDIZED SENNA CONCENTRATE AND DOCUSATE SODIUM SCH TAB: 8.6; 5 TABLET, FILM COATED ORAL at 08:46

## 2018-05-20 RX ADMIN — SODIUM CHLORIDE SCH MLS/HR: 900 INJECTION INTRAVENOUS at 17:55

## 2018-05-20 RX ADMIN — SODIUM CHLORIDE SCH MLS/HR: 900 INJECTION INTRAVENOUS at 04:20

## 2018-05-20 NOTE — HHI.PR
Subjective


Remarks


No significant clinical change from yesterday.  No fevers overnight.  

Leukocytosis is present but could be related to steroids.





Objective





Vital Signs








  Date Time  Temp Pulse Resp B/P (MAP) Pulse Ox O2 Delivery O2 Flow Rate FiO2


 


5/20/18 08:10 98.2 90  119/73 (88) 96   


 


5/20/18 08:10        


 


5/20/18 03:03 98.0 65 18 138/64 (88) 96   


 


5/19/18 23:41 98.4 61 18 126/60 (82) 96   


 


5/19/18 23:20   16     


 


5/19/18 19:04 99.2 80 18 126/58 (80) 97   


 


5/19/18 15:57 99.0 83 20 117/68 (84) 98   


 


5/19/18 11:54 98.8 86 20 116/58 (77) 99   














I/O      


 


 5/19/18 5/19/18 5/19/18 5/20/18 5/20/18 5/20/18





 07:00 15:00 23:00 07:00 15:00 23:00


 


Intake Total 250 ml 100 ml    


 


Balance 250 ml 100 ml    


 


      


 


Intake IV Total 250 ml 100 ml    


 


# Voids   4   








Result Diagram:  


5/20/18 0800                                                                   

             5/20/18 0800





Objective Remarks


GENERAL: NAD, A&Ox3


HEAD: Normocephalic. 


NECK: Supple, trachea midline. No lymphadenopathy.


EYES: No scleral icterus. No injection or drainage. 


CARDIOVASCULAR: Regular rate and rhythm without murmurs, gallops, or rubs. 


RESPIRATORY: Breath sounds equal bilaterally. No accessory muscle use.


GASTROINTESTINAL: Abdomen soft, non-tender, nondistended. 


MUSCULOSKELETAL: No cyanosis, or edema. 


SKIN: Warm and dry.  Right lower extremity has bright erythema with mild 

induration at middle half of lower extremity.  She has some patchy areas of 

punctate erythema at the inferior right medial foot.


NEURO:  No focal neurological deficitis.





A/P


Problem List:  


(1) Vasculitis


ICD Code:  I77.6 - Arteritis, unspecified


(2) Cellulitis


ICD Code:  L03.90 - Cellulitis, unspecified


Assessment and Plan


37 year old female admitted with Vasculitis and Cellulitis of RLE.





Right Lower Extremity Cellulitis


Right Lower Extremity Vasculitis


Covering with Vancomycin and Cefepime


ESR and CRP are significantly elevated


Alternate etiology could be autoimmune


Steroids provided for inflammation (potential autoimmune) component


RICHARD pending


RF pending


TSH, T3, T4 ordered





Chronic Anemia


Follow CBC





CAD


Old MI


Asymptomatic


Follow clinically





DVT Prophylaxis


Darrin Araujo MD May 20, 2018 10:22

## 2018-05-21 VITALS
SYSTOLIC BLOOD PRESSURE: 127 MMHG | TEMPERATURE: 98.4 F | DIASTOLIC BLOOD PRESSURE: 72 MMHG | HEART RATE: 67 BPM | RESPIRATION RATE: 18 BRPM | OXYGEN SATURATION: 98 %

## 2018-05-21 VITALS
SYSTOLIC BLOOD PRESSURE: 148 MMHG | HEART RATE: 65 BPM | OXYGEN SATURATION: 98 % | DIASTOLIC BLOOD PRESSURE: 74 MMHG | RESPIRATION RATE: 18 BRPM | TEMPERATURE: 98.7 F

## 2018-05-21 VITALS
OXYGEN SATURATION: 99 % | TEMPERATURE: 98.7 F | DIASTOLIC BLOOD PRESSURE: 83 MMHG | SYSTOLIC BLOOD PRESSURE: 148 MMHG | HEART RATE: 59 BPM | RESPIRATION RATE: 16 BRPM

## 2018-05-21 VITALS
SYSTOLIC BLOOD PRESSURE: 140 MMHG | RESPIRATION RATE: 17 BRPM | DIASTOLIC BLOOD PRESSURE: 71 MMHG | HEART RATE: 67 BPM | OXYGEN SATURATION: 99 % | TEMPERATURE: 98.4 F

## 2018-05-21 VITALS
RESPIRATION RATE: 18 BRPM | DIASTOLIC BLOOD PRESSURE: 72 MMHG | HEART RATE: 66 BPM | TEMPERATURE: 98.8 F | OXYGEN SATURATION: 99 % | SYSTOLIC BLOOD PRESSURE: 153 MMHG

## 2018-05-21 LAB
ALBUMIN SERPL-MCNC: 2.8 GM/DL (ref 3.4–5)
ALP SERPL-CCNC: 71 U/L (ref 45–117)
ALT SERPL-CCNC: 24 U/L (ref 10–53)
AST SERPL-CCNC: 11 U/L (ref 15–37)
BASOPHILS # BLD AUTO: 0 TH/MM3 (ref 0–0.2)
BASOPHILS NFR BLD: 0.3 % (ref 0–2)
BILIRUB SERPL-MCNC: 0.2 MG/DL (ref 0.2–1)
BUN SERPL-MCNC: 11 MG/DL (ref 7–18)
CALCIUM SERPL-MCNC: 8.6 MG/DL (ref 8.5–10.1)
CHLORIDE SERPL-SCNC: 108 MEQ/L (ref 98–107)
CREAT SERPL-MCNC: 0.64 MG/DL (ref 0.5–1)
EOSINOPHIL # BLD: 0 TH/MM3 (ref 0–0.4)
EOSINOPHIL NFR BLD: 0.1 % (ref 0–4)
ERYTHROCYTE [DISTWIDTH] IN BLOOD BY AUTOMATED COUNT: 32 % (ref 11.6–17.2)
GFR SERPLBLD BASED ON 1.73 SQ M-ARVRAT: 104 ML/MIN (ref 89–?)
GLUCOSE SERPL-MCNC: 106 MG/DL (ref 74–106)
HCO3 BLD-SCNC: 23.9 MEQ/L (ref 21–32)
HCT VFR BLD CALC: 30.1 % (ref 35–46)
HGB BLD-MCNC: 9.3 GM/DL (ref 11.6–15.3)
LYMPHOCYTES # BLD AUTO: 1.8 TH/MM3 (ref 1–4.8)
LYMPHOCYTES NFR BLD AUTO: 14.5 % (ref 9–44)
MCH RBC QN AUTO: 22.6 PG (ref 27–34)
MCHC RBC AUTO-ENTMCNC: 30.9 % (ref 32–36)
MCV RBC AUTO: 73.3 FL (ref 80–100)
MONOCYTE #: 0.6 TH/MM3 (ref 0–0.9)
MONOCYTES NFR BLD: 5.2 % (ref 0–8)
NEUTROPHILS # BLD AUTO: 9.9 TH/MM3 (ref 1.8–7.7)
NEUTROPHILS NFR BLD AUTO: 79.9 % (ref 16–70)
OVALOCYTES BLD QL SMEAR: (no result)
PLATELET # BLD: 262 TH/MM3 (ref 150–450)
PMV BLD AUTO: 7.4 FL (ref 7–11)
PROT SERPL-MCNC: 6.5 GM/DL (ref 6.4–8.2)
RBC # BLD AUTO: 4.11 MIL/MM3 (ref 4–5.3)
SODIUM SERPL-SCNC: 141 MEQ/L (ref 136–145)
T3FREE SERPL-MCNC: 1.34 PG/ML (ref 2.18–3.98)
T4 SERPL-MCNC: 6.7 MCG/DL (ref 4.8–13.9)
WBC # BLD AUTO: 12.4 TH/MM3 (ref 4–11)

## 2018-05-21 RX ADMIN — HYDROCODONE BITARTRATE AND ACETAMINOPHEN PRN TAB: 10; 325 TABLET ORAL at 18:14

## 2018-05-21 RX ADMIN — PHENYTOIN SODIUM SCH MLS/HR: 50 INJECTION INTRAMUSCULAR; INTRAVENOUS at 18:41

## 2018-05-21 RX ADMIN — PHENYTOIN SODIUM SCH MLS/HR: 50 INJECTION INTRAMUSCULAR; INTRAVENOUS at 05:21

## 2018-05-21 RX ADMIN — STANDARDIZED SENNA CONCENTRATE AND DOCUSATE SODIUM SCH TAB: 8.6; 5 TABLET, FILM COATED ORAL at 08:48

## 2018-05-21 RX ADMIN — PHENYTOIN SODIUM SCH MLS/HR: 50 INJECTION INTRAMUSCULAR; INTRAVENOUS at 17:09

## 2018-05-21 RX ADMIN — CEFAZOLIN SODIUM SCH MLS/HR: 2 SOLUTION INTRAVENOUS at 14:03

## 2018-05-21 RX ADMIN — CEFEPIME SCH MLS/HR: 1 INJECTION, POWDER, FOR SOLUTION INTRAMUSCULAR; INTRAVENOUS at 05:23

## 2018-05-21 RX ADMIN — CLINDAMYCIN PHOSPHATE SCH MLS/HR: 600 INJECTION, SOLUTION INTRAVENOUS at 13:22

## 2018-05-21 RX ADMIN — HYDROCODONE BITARTRATE AND ACETAMINOPHEN PRN TAB: 10; 325 TABLET ORAL at 14:07

## 2018-05-21 RX ADMIN — STANDARDIZED SENNA CONCENTRATE AND DOCUSATE SODIUM SCH TAB: 8.6; 5 TABLET, FILM COATED ORAL at 21:04

## 2018-05-21 RX ADMIN — PHENYTOIN SODIUM SCH MLS/HR: 50 INJECTION INTRAMUSCULAR; INTRAVENOUS at 07:09

## 2018-05-21 RX ADMIN — Medication SCH ML: at 08:48

## 2018-05-21 RX ADMIN — HYDROCODONE BITARTRATE AND ACETAMINOPHEN PRN TAB: 10; 325 TABLET ORAL at 05:23

## 2018-05-21 RX ADMIN — CLINDAMYCIN PHOSPHATE SCH MLS/HR: 600 INJECTION, SOLUTION INTRAVENOUS at 22:33

## 2018-05-21 RX ADMIN — Medication SCH ML: at 21:05

## 2018-05-21 RX ADMIN — METHYLPREDNISOLONE SODIUM SUCCINATE SCH MG: 40 INJECTION, POWDER, FOR SOLUTION INTRAMUSCULAR; INTRAVENOUS at 21:04

## 2018-05-21 RX ADMIN — ENOXAPARIN SODIUM SCH MG: 40 INJECTION SUBCUTANEOUS at 14:08

## 2018-05-21 RX ADMIN — METHYLPREDNISOLONE SODIUM SUCCINATE SCH MG: 40 INJECTION, POWDER, FOR SOLUTION INTRAMUSCULAR; INTRAVENOUS at 08:48

## 2018-05-21 RX ADMIN — CEFAZOLIN SODIUM SCH MLS/HR: 2 SOLUTION INTRAVENOUS at 21:05

## 2018-05-21 RX ADMIN — HYDROCODONE BITARTRATE AND ACETAMINOPHEN PRN TAB: 10; 325 TABLET ORAL at 10:05

## 2018-05-21 RX ADMIN — HYDROCODONE BITARTRATE AND ACETAMINOPHEN PRN TAB: 10; 325 TABLET ORAL at 22:35

## 2018-05-21 RX ADMIN — SODIUM CHLORIDE SCH MLS/HR: 900 INJECTION INTRAVENOUS at 06:11

## 2018-05-21 NOTE — HHI.PR
Subjective


Remarks


Follow up for cellulitis/vasculitis. The patient reports only minimal 

improvement or RLE erythema/edema. She remains afebrile. Pain well controlled. 

Denies any other medical complaints at this time.





Objective


Vitals





Vital Signs








  Date Time  Temp Pulse Resp B/P (MAP) Pulse Ox O2 Delivery O2 Flow Rate FiO2


 


5/21/18 07:20 98.4 67 17 140/71 (94) 99   


 


5/21/18 06:23   15     


 


5/21/18 03:27 98.4 67 18 127/72 (90) 98   


 


5/20/18 20:03 98.5 70 18 136/61 (86) 97   


 


5/20/18 18:00 98.2 70 20 120/62 (81) 98   


 


5/20/18 12:25 99.2 65 18 130/60 (83) 96   








Result Diagram:  


5/21/18 0839                                                                   

             5/20/18 0800





Objective Remarks


GENERAL: Well-nourished, well-developed female patient in NAD.


SKIN: Warm and dry.  


HEENT:  Normocephalic. Atraumatic. Pupils equal and round.  Mucous membranes 

pink and moist.


CARDIOVASCULAR: Regular rate and rhythm.  No murmur appreciated. 


RESPIRATORY: No accessory muscle use. Clear to auscultation. Breath sounds 

equal bilaterally.  


GASTROINTESTINAL: Abdomen soft, non-tender, nondistended. Normoactive bowel 

sounds x4.


MUSCULOSKELETAL: No obvious deformities. RLE with 1+edema and diffuse erythema 

from distal calf to ankle with some patchy punctate areas of erythema at medial 

foot; warm to touch. 


NEUROLOGICAL: Awake and alert. No obvious cranial nerve deficits.  Motor 

grossly within normal limits. Moving all extremities spontaneously. Normal 

speech.


PSYCHIATRIC: Appropriate mood and affect; insight and judgment normal.


Medications and IVs





Current Medications








 Medications


  (Trade)  Dose


 Ordered  Sig/Ronnell


 Route  Start Time


 Stop Time Status Last Admin


 


 Pharmacy Profile


 Note  0 ml @ 0


 mls/hr  [facility]


 OTHER  5/19/18 05:15


     


 


 


 Cefepime HCl 1000


 mg/Sodium Chloride  100 ml @ 


 200 mls/hr  Q12H


 IV  5/19/18 06:00


    5/21/18 05:23


 


 


 Sodium Chloride  1,000 ml @ 


 100 mls/hr  Q10H


 IV  5/19/18 05:09


    5/21/18 07:09


 


 


  (NS Flush)  2 ml  UNSCH  PRN


 IV FLUSH  5/19/18 05:15


     


 


 


  (NS Flush)  2 ml  BID


 IV FLUSH  5/19/18 09:00


    5/21/18 08:48


 


 


  (Reglan Inj)  5 mg  Q6H  PRN


 IV PUSH  5/19/18 05:15


     


 


 


  (Tylenol)  650 mg  Q6H  PRN


 PO  5/19/18 05:15


     


 


 


  (Norco  5-325 Mg)  1 tab  Q4H  PRN


 PO  5/19/18 05:15


     


 


 


  (Norco  Mg)  1 tab  Q4H  PRN


 PO  5/19/18 05:15


    5/21/18 10:05


 


 


  (Debra-Colace)  1 tab  BID


 PO  5/19/18 09:00


    5/21/18 08:48


 


 


  (Milk Of


 Magnesia Liq)  30 ml  Q12H  PRN


 PO  5/19/18 05:15


     


 


 


  (Senokot)  17.2 mg  Q12H  PRN


 PO  5/19/18 05:15


     


 


 


  (Dulcolax Supp)  10 mg  DAILY  PRN


 RECTAL  5/19/18 05:15


     


 


 


  (Lactulose Liq)  30 ml  DAILY  PRN


 PO  5/19/18 05:15


     


 


 


  (SoluMEDROL INJ)  20 mg  Q12HR


 IV PUSH  5/19/18 21:00


    5/21/18 08:48


 


 


  (Lovenox Inj)  40 mg  Q24H


 SQ  5/19/18 14:00


    5/20/18 13:19


 


 


 Vancomycin HCl


 1500 mg/Sodium


 Chloride  515 ml @ 


 250 mls/hr  Q12H


 IV  5/20/18 17:00


    5/21/18 06:11


 


 


  ([facility]


 Ordered Lab Info)  SPECIFIC


 LAB TO BE


 ...  ONCE  ONCE


 .XX  5/21/18 16:45


 5/21/18 16:46   


 











A/P


Problem List:  


(1) Cellulitis


ICD Code:  L03.90 - Cellulitis, unspecified


(2) Vasculitis


ICD Code:  I77.6 - Arteritis, unspecified


Assessment and Plan


37 year old female with history of chronic anemia, arthritis, anxiety, CAD, 

endometriosis, presents with RLE redness and swelling x2days.  





RLE Cellulitis/Vasculitis: Afebrile, no leukocytosis but with +band 

neutrophils. ESR elevated at 69 and CRP elevated at 21. 


   -Continue antibiotics with IV Vancomycin and Cefepime


   -With elevated inflammatory markers, possible etiology could be autoimmune, 

RF negative, RICHARD pending, TSH/T4 wnl. 


   -Continue steroids IV Solumedrol 20mg bid for inflammation (potential 

autoimmune) component


   -Continue IVF, pain control prn


   -Minimal improvement on IV abx, will consult Infectious Disease





Chronic Anemia: Hgb 11.8 --> 10.1 --> 9.3


   -No signs of active bleeding


   -Follow CBC trend





CAD, Old MI: chronic, asymptomatic


   -Follow clinically





DVT Prophylaxis: Lovenox


Discharge Planning


Discharge pending further clinical improvement, ID evaluation and clearance for 

discharge.











Rosie Camargo PA-C May 21, 2018 9:38 am

## 2018-05-21 NOTE — PD.CONS
History of Present Illness


Service


Infectious disease


Consult Requested By


KALLIE Alvarado


Reason for Consult


Evaluate patient with right lower extremity cellulitis, not improving


Primary Care Physician


Connie West MD


Diagnoses:  


History of Present Illness


Patient seen and examined.  Records reviewed.





Patient is a 37-year-old female, presented to the hospital for evaluation of 

redness on her right leg and foot.  His problem started about 5 days prior to 

admission when she had an acute onset of fevers.  He did not think much of it, 

however about 2 days prior to admission she started noticing redness on her 

right leg and right foot.  It started getting worse, and she presented to the 

hospital May 19 for further evaluation and treatment.  Since admission she has 

not had any fever.  She was started on vancomycin and cefepime.  Her WBC is 

mildly elevated at 12,000.  It was felt that she was not improving, so 

infectious disease consultation has been requested to evaluate the patient.  

Patient stated that the redness on her right foot as well as on the anterior 

right leg is probably a little bit better than when she came in.  She is 

complaining of a lot of pain.  She denies any respiratory complaint as far as 

any sore throat or any cough or congestion.  Denies any nausea or vomiting, 

diarrhea, abdominal pain or any urinary complaints.





Review of Systems


Constitutional:  COMPLAINS OF: Fever, Chills, DENIES: Night Sweats


Eyes:  DENIES: Eye pain


Ears, nose, mouth, throat:  DENIES: Nasal discharge, Oral lesions, Throat pain, 

Hoarseness, Sinus Pain


Respiratory:  DENIES: Cough, Shortness of breath


Cardiovascular:  COMPLAINS OF: Lower Extremity Edema, DENIES: Chest pain, 

Palpitations, Syncope, Dyspnea on Exertion


Gastrointestinal:  DENIES: Abdominal pain, Constipation, Diarrhea, Nausea, 

Vomiting, Difficulty Swallowing


Genitourinary:  DENIES: Urinary frequency, Urgency, Dysuria


Musculoskeletal:  COMPLAINS OF: Joint pain, Joint Swelling


Integumentary:  COMPLAINS OF: Rash, DENIES: Pruritus


Neurologic:  DENIES: Headache, Localized weakness


Psychiatric:  DENIES: Hallucinations





Past Family Social History


Allergies:  


Coded Allergies:  


     diphenhydramine (Unverified  Allergy, Unknown, 2/15/18)


Past Medical History


Chronic Anemia


Osteoarthritis


General Anxiety Disorder


CAD


Old MI


Hx of Headaches


Endometriosis


Ovarian Cysts


Past Surgical History





Cholecystectomy


Tooth Extraction


Tonsillectomy


Active Ordered Medications





Current Medications








 Medications


  (Trade)  Dose


 Ordered  Sig/Ronnell


 Route  Start Time


 Stop Time Status Last Admin


 


 Pharmacy Profile


 Note  0 ml @ 0


 mls/hr  UNSCH


 OTHER  18 05:15


     


 


 


 Cefepime HCl 1000


 mg/Sodium Chloride  100 ml @ 


 200 mls/hr  Q12H


 IV  18 06:00


    18 05:23


 


 


 Sodium Chloride  1,000 ml @ 


 100 mls/hr  Q10H


 IV  18 05:09


    18 07:09


 


 


  (NS Flush)  2 ml  UNSCH  PRN


 IV FLUSH  18 05:15


     


 


 


  (NS Flush)  2 ml  BID


 IV FLUSH  18 09:00


    18 08:48


 


 


  (Reglan Inj)  5 mg  Q6H  PRN


 IV PUSH  18 05:15


     


 


 


  (Tylenol)  650 mg  Q6H  PRN


 PO  18 05:15


     


 


 


  (Norco  5-325 Mg)  1 tab  Q4H  PRN


 PO  18 05:15


     


 


 


  (Norco  Mg)  1 tab  Q4H  PRN


 PO  18 05:15


    18 10:05


 


 


  (Debra-Colace)  1 tab  BID


 PO  18 09:00


    18 08:48


 


 


  (Milk Of


 Magnesia Liq)  30 ml  Q12H  PRN


 PO  18 05:15


     


 


 


  (Senokot)  17.2 mg  Q12H  PRN


 PO  18 05:15


     


 


 


  (Dulcolax Supp)  10 mg  DAILY  PRN


 RECTAL  18 05:15


     


 


 


  (Lactulose Liq)  30 ml  DAILY  PRN


 PO  18 05:15


     


 


 


  (SoluMEDROL INJ)  20 mg  Q12HR


 IV PUSH  18 21:00


    18 08:48


 


 


  (Lovenox Inj)  40 mg  Q24H


 SQ  18 14:00


    18 13:19


 


 


 Vancomycin HCl


 1500 mg/Sodium


 Chloride  515 ml @ 


 250 mls/hr  Q12H


 IV  18 17:00


    18 06:11


 


 


  ([facility]


 Ordered Lab Info)  SPECIFIC


 LAB TO BE


 ASHIA...  ONCE  ONCE


 .XX  18 16:45


 18 16:46   


 








Family History


Noncontributory


Social History


Alcohol Use:  Yes (OCCASIONALLY)


Tobacco Use:  Yes (1/2 PPD)


Substance Use:  No





Physical Exam


Vital Signs





Vital Signs








  Date Time  Temp Pulse Resp B/P (MAP) Pulse Ox O2 Delivery O2 Flow Rate FiO2


 


18 11:15 98.7 65 18 148/74 (98) 98   


 


18 11:05   18     


 


18 07:20 98.4 67 17 140/71 (94) 99   


 


18 03:27 98.4 67 18 127/72 (90) 98   


 


18 20:03 98.5 70 18 136/61 (86) 97   


 


18 18:00 98.2 70 20 120/62 (81) 98   








Physical Exam


GENERAL:   Patient is an obese,  well-developed female,   awake and alert,   

not in respiratory distress.


SKIN:   Cool and dry.  No generalized rash, no ecchymoses and no evidence of 

embolic lesions.


HEAD:   Atraumatic. Normocephalic.  No temporal wasting, or tenderness.


EYES:  Pink conjunctiva. No petechia or hemorrhage.   Pupils equal,  round and 

reactive to light.  Extraocular movements full and intact.   No scleral 

icterus. No injection or drainage. 


EARS, NOSE AND THROAT:   Nose without bleeding or purulent  nasal discharge.  

No sinus tenderness.  Mucous membranes pink and moist. No oral lesions noted. 

No exudate.  No oral thrush.


NECK: Trachea midline.  Supple and not tender, no meningeal signs 


CARDIOVASCULAR: Regular rate and rhythm.  No murmurs, rubs or gallops heard


RESPIRATORY:  Clear to auscultation. Breath sounds equal bilaterally.  No rales

, wheezing or rhonchi


ABDOMEN:  Soft, non-tender, nondistended.  Bowel sounds present and 

normoactive.  No guarding. No rebound.   No organomegaly. 


EXTREMITIES:   No clubbing, cyanosis.  RLE slightly larger compared to LLE.  

There is a bright red area on distal half of her R leg, with peau-d-orange 

texture posteriorly, and patches of redness on the medial R foot up to the big 

toe.


No joint effusion, has good ROM.  No open wound noted.  No calf tenderness.  

Well perfused and warm.  


NEUROLOGICAL: Awake and alert.  Cranial nerves grossly intact. Motor grossly 

within normal limits.


PSYCHIATRIC:  Normal affect,  calm and cooperative.


LINE:  No evidence of infection


Laboratory





Laboratory Tests








Test


  18


08:38 18


08:39


 


Blood Urea Nitrogen 11  


 


Creatinine 0.64  


 


Random Glucose 106  


 


Total Protein 6.5  


 


Albumin 2.8  


 


Calcium Level 8.6  


 


Alkaline Phosphatase 71  


 


Aspartate Amino Transf


(AST/SGOT) 11 


  


 


 


Alanine Aminotransferase


(ALT/SGPT) 24 


  


 


 


Total Bilirubin 0.2  


 


Sodium Level 141  


 


Potassium Level 4.3  


 


Chloride Level 108  


 


Carbon Dioxide Level 23.9  


 


Anion Gap 9  


 


Estimat Glomerular Filtration


Rate 104 


  


 


 


Thyroxine (T4) 6.7  


 


Free Triiodothyronine (T3)


pg/dL 1.34 


  


 


 


Thyroid Stimulating Hormone


3rd Gen 1.280 


  


 


 


White Blood Count  12.4 


 


Red Blood Count  4.11 


 


Hemoglobin  9.3 


 


Hematocrit  30.1 


 


Mean Corpuscular Volume  73.3 


 


Mean Corpuscular Hemoglobin  22.6 


 


Mean Corpuscular Hemoglobin


Concent 


  30.9 


 


 


Red Cell Distribution Width  32.0 


 


Platelet Count  262 


 


Mean Platelet Volume  7.4 


 


Neutrophils (%) (Auto)  79.9 


 


Lymphocytes (%) (Auto)  14.5 


 


Monocytes (%) (Auto)  5.2 


 


Eosinophils (%) (Auto)  0.1 


 


Basophils (%) (Auto)  0.3 


 


Neutrophils # (Auto)  9.9 


 


Lymphocytes # (Auto)  1.8 


 


Monocytes # (Auto)  0.6 


 


Eosinophils # (Auto)  0.0 


 


Basophils # (Auto)  0.0 


 


CBC Comment  AUTO DIFF 


 


Differential Comment


  


  AUTO DIFF


CONFIRMED


 


Platelet Estimate  NORMAL 


 


Platelet Morphology Comment  NORMAL 


 


Ovalocytes  1+ 














 Date/Time


Source Procedure


Growth Status


 


 


 18 21:55


Blood Peripheral Aerobic Blood Culture - Preliminary


NO GROWTH IN 3 DAYS Resulted


 


 18 21:55


Blood Peripheral Anaerobic Blood Culture - Preliminary


NO GROWTH IN 3 DAYS Resulted








Result Diagram:  


18 0839                                                                   

             18 0838








Assessment and Plan


Assessment and Plan


IMPRESSION


Cellulitis RLE, clinically looks like erysipelas, some improvement per patient 

with degree of redness


   -  clinical presentation also the usual Strep infection with fever preceding 

onset of cellulitis





RECOMMENDATION


IV Ancef


Short course of Clindamycin to decrease toxin effects


Elevate RLE


Monitor response to new Rx





I will follow along with you


Thank you for this consultation


Discussed Condition With


Explained plan to the patient











Rachel Valdivia MD May 21, 2018 12:45

## 2018-05-22 VITALS
SYSTOLIC BLOOD PRESSURE: 183 MMHG | DIASTOLIC BLOOD PRESSURE: 96 MMHG | OXYGEN SATURATION: 99 % | TEMPERATURE: 98.2 F | HEART RATE: 56 BPM | RESPIRATION RATE: 18 BRPM

## 2018-05-22 VITALS
SYSTOLIC BLOOD PRESSURE: 134 MMHG | HEART RATE: 67 BPM | DIASTOLIC BLOOD PRESSURE: 65 MMHG | OXYGEN SATURATION: 97 % | RESPIRATION RATE: 16 BRPM | TEMPERATURE: 98 F

## 2018-05-22 VITALS
HEART RATE: 59 BPM | SYSTOLIC BLOOD PRESSURE: 163 MMHG | OXYGEN SATURATION: 98 % | RESPIRATION RATE: 18 BRPM | TEMPERATURE: 99 F | DIASTOLIC BLOOD PRESSURE: 77 MMHG

## 2018-05-22 VITALS
HEART RATE: 62 BPM | OXYGEN SATURATION: 99 % | DIASTOLIC BLOOD PRESSURE: 68 MMHG | RESPIRATION RATE: 18 BRPM | SYSTOLIC BLOOD PRESSURE: 134 MMHG | TEMPERATURE: 98.7 F

## 2018-05-22 VITALS
SYSTOLIC BLOOD PRESSURE: 146 MMHG | RESPIRATION RATE: 16 BRPM | HEART RATE: 54 BPM | DIASTOLIC BLOOD PRESSURE: 69 MMHG | OXYGEN SATURATION: 96 % | TEMPERATURE: 98.1 F

## 2018-05-22 VITALS
HEART RATE: 63 BPM | TEMPERATURE: 98.4 F | RESPIRATION RATE: 16 BRPM | SYSTOLIC BLOOD PRESSURE: 146 MMHG | OXYGEN SATURATION: 99 % | DIASTOLIC BLOOD PRESSURE: 73 MMHG

## 2018-05-22 VITALS
RESPIRATION RATE: 20 BRPM | SYSTOLIC BLOOD PRESSURE: 142 MMHG | OXYGEN SATURATION: 98 % | HEART RATE: 84 BPM | TEMPERATURE: 97.2 F | DIASTOLIC BLOOD PRESSURE: 76 MMHG

## 2018-05-22 VITALS — OXYGEN SATURATION: 99 % | HEART RATE: 68 BPM

## 2018-05-22 LAB
BASOPHILS # BLD AUTO: 0 TH/MM3 (ref 0–0.2)
BASOPHILS NFR BLD: 0.3 % (ref 0–2)
BUN SERPL-MCNC: 12 MG/DL (ref 7–18)
CALCIUM SERPL-MCNC: 8.5 MG/DL (ref 8.5–10.1)
CHLORIDE SERPL-SCNC: 107 MEQ/L (ref 98–107)
CREAT SERPL-MCNC: 0.64 MG/DL (ref 0.5–1)
EOSINOPHIL # BLD: 0 TH/MM3 (ref 0–0.4)
EOSINOPHIL NFR BLD: 0.1 % (ref 0–4)
ERYTHROCYTE [DISTWIDTH] IN BLOOD BY AUTOMATED COUNT: 31.8 % (ref 11.6–17.2)
GFR SERPLBLD BASED ON 1.73 SQ M-ARVRAT: 104 ML/MIN (ref 89–?)
GLUCOSE SERPL-MCNC: 139 MG/DL (ref 74–106)
HCO3 BLD-SCNC: 24.2 MEQ/L (ref 21–32)
HCT VFR BLD CALC: 30.8 % (ref 35–46)
HGB BLD-MCNC: 9.4 GM/DL (ref 11.6–15.3)
LYMPHOCYTES # BLD AUTO: 1.8 TH/MM3 (ref 1–4.8)
LYMPHOCYTES NFR BLD AUTO: 14.3 % (ref 9–44)
MCH RBC QN AUTO: 22.5 PG (ref 27–34)
MCHC RBC AUTO-ENTMCNC: 30.6 % (ref 32–36)
MCV RBC AUTO: 73.6 FL (ref 80–100)
MONOCYTE #: 0.5 TH/MM3 (ref 0–0.9)
MONOCYTES NFR BLD: 4.1 % (ref 0–8)
NEUTROPHILS # BLD AUTO: 10.5 TH/MM3 (ref 1.8–7.7)
NEUTROPHILS NFR BLD AUTO: 81.2 % (ref 16–70)
PLATELET # BLD: 287 TH/MM3 (ref 150–450)
PMV BLD AUTO: 7.5 FL (ref 7–11)
RBC # BLD AUTO: 4.18 MIL/MM3 (ref 4–5.3)
SODIUM SERPL-SCNC: 141 MEQ/L (ref 136–145)
WBC # BLD AUTO: 12.9 TH/MM3 (ref 4–11)

## 2018-05-22 RX ADMIN — FERROUS SULFATE TAB 325 MG (65 MG ELEMENTAL FE) SCH MG: 325 (65 FE) TAB at 14:32

## 2018-05-22 RX ADMIN — METOPROLOL TARTRATE SCH MG: 25 TABLET, FILM COATED ORAL at 10:02

## 2018-05-22 RX ADMIN — CEFAZOLIN SODIUM SCH MLS/HR: 2 SOLUTION INTRAVENOUS at 14:32

## 2018-05-22 RX ADMIN — CLINDAMYCIN PHOSPHATE SCH MLS/HR: 600 INJECTION, SOLUTION INTRAVENOUS at 06:36

## 2018-05-22 RX ADMIN — CEFAZOLIN SODIUM SCH MLS/HR: 2 SOLUTION INTRAVENOUS at 22:18

## 2018-05-22 RX ADMIN — HYDROCODONE BITARTRATE AND ACETAMINOPHEN PRN TAB: 10; 325 TABLET ORAL at 21:53

## 2018-05-22 RX ADMIN — METHYLPREDNISOLONE SODIUM SUCCINATE SCH MG: 40 INJECTION, POWDER, FOR SOLUTION INTRAMUSCULAR; INTRAVENOUS at 10:01

## 2018-05-22 RX ADMIN — HYDROCODONE BITARTRATE AND ACETAMINOPHEN PRN TAB: 10; 325 TABLET ORAL at 10:03

## 2018-05-22 RX ADMIN — HYDROCODONE BITARTRATE AND ACETAMINOPHEN PRN TAB: 10; 325 TABLET ORAL at 14:33

## 2018-05-22 RX ADMIN — ASPIRIN SCH MG: 81 TABLET ORAL at 10:02

## 2018-05-22 RX ADMIN — ENOXAPARIN SODIUM SCH MG: 40 INJECTION SUBCUTANEOUS at 14:32

## 2018-05-22 RX ADMIN — FERROUS SULFATE TAB 325 MG (65 MG ELEMENTAL FE) SCH MG: 325 (65 FE) TAB at 18:53

## 2018-05-22 RX ADMIN — FERROUS SULFATE TAB 325 MG (65 MG ELEMENTAL FE) SCH MG: 325 (65 FE) TAB at 10:02

## 2018-05-22 RX ADMIN — HYDROCODONE BITARTRATE AND ACETAMINOPHEN PRN TAB: 10; 325 TABLET ORAL at 02:11

## 2018-05-22 RX ADMIN — METOPROLOL TARTRATE SCH MG: 25 TABLET, FILM COATED ORAL at 21:48

## 2018-05-22 RX ADMIN — STANDARDIZED SENNA CONCENTRATE AND DOCUSATE SODIUM SCH TAB: 8.6; 5 TABLET, FILM COATED ORAL at 10:02

## 2018-05-22 RX ADMIN — CLINDAMYCIN PHOSPHATE SCH MLS/HR: 600 INJECTION, SOLUTION INTRAVENOUS at 15:12

## 2018-05-22 RX ADMIN — Medication SCH ML: at 10:01

## 2018-05-22 RX ADMIN — CEFAZOLIN SODIUM SCH MLS/HR: 2 SOLUTION INTRAVENOUS at 06:00

## 2018-05-22 RX ADMIN — CLINDAMYCIN PHOSPHATE SCH MLS/HR: 600 INJECTION, SOLUTION INTRAVENOUS at 21:47

## 2018-05-22 RX ADMIN — Medication SCH ML: at 21:48

## 2018-05-22 RX ADMIN — HYDROCODONE BITARTRATE AND ACETAMINOPHEN PRN TAB: 10; 325 TABLET ORAL at 06:00

## 2018-05-22 RX ADMIN — STANDARDIZED SENNA CONCENTRATE AND DOCUSATE SODIUM SCH TAB: 8.6; 5 TABLET, FILM COATED ORAL at 21:48

## 2018-05-22 NOTE — HHI.IDPN
Subjective


Subjective


Remarks


Patient is a 37-year-old female, presented to the hospital for evaluation of 

redness on her right leg and foot.  His problem started about 5 days prior to 

admission when she had an acute onset of fevers.  He did not think much of it, 

however about 2 days prior to admission she started noticing redness on her 

right leg and right foot.  It started getting worse, and she presented to the 

hospital May 19 for further evaluation and treatment.  Since admission she has 

not had any fever.  She was started on vancomycin and cefepime.  Her WBC is 

mildly elevated at 12,000.  It was felt that she was not improving, so 

infectious disease consultation has been requested to evaluate the patient.  

Patient stated that the redness on her right foot as well as on the anterior 

right leg is probably a little bit better than when she came in.  She is 

complaining of a lot of pain.  She denies any respiratory complaint as far as 

any sore throat or any cough or congestion.  Denies any nausea or vomiting, 

diarrhea, abdominal pain or any urinary complaints.





Notes reviewed


Temps ok


Feels better


Wants to ambulate more


Antibiotics


Ancef


Clindamycin


Current Medications








 Medications


  (Trade)  Dose


 Ordered  Sig/Ronnell


 Route  Start Time


 Stop Time Status Last Admin


 


  (NS Flush)  2 ml  UNSCH  PRN


 IV FLUSH  18 05:15


     


 


 


  (NS Flush)  2 ml  BID


 IV FLUSH  18 09:00


    18 10:01


 


 


  (Reglan Inj)  5 mg  Q6H  PRN


 IV PUSH  18 05:15


     


 


 


  (Tylenol)  650 mg  Q6H  PRN


 PO  18 05:15


     


 


 


  (Norco  5-325 Mg)  1 tab  Q4H  PRN


 PO  18 05:15


     


 


 


  (Norco  Mg)  1 tab  Q4H  PRN


 PO  18 05:15


    18 10:03


 


 


  (Debra-Colace)  1 tab  BID


 PO  18 09:00


    18 10:02


 


 


  (Milk Of


 Magnesia Liq)  30 ml  Q12H  PRN


 PO  18 05:15


     


 


 


  (Senokot)  17.2 mg  Q12H  PRN


 PO  18 05:15


     


 


 


  (Dulcolax Supp)  10 mg  DAILY  PRN


 RECTAL  18 05:15


     


 


 


  (Lactulose Liq)  30 ml  DAILY  PRN


 PO  18 05:15


     


 


 


  (SoluMEDROL INJ)  20 mg  Q12HR


 IV PUSH  18 21:00


    18 10:01


 


 


  (Lovenox Inj)  40 mg  Q24H


 SQ  18 14:00


    18 14:08


 


 


 Cefazolin Sodium/


 Dextrose  50 ml @ 


 100 mls/hr  Q8H


 IV  18 14:00


    18 06:00


 


 


 Clindamycin/


 Sodium Chloride  50 ml @ 


 100 mls/hr  Q8H


 IV  18 14:00


 18 06:29  18 06:36


 


 


  (Ecotrin Ec)  81 mg  DAILY


 PO  18 09:00


    18 10:02


 


 


  (Lipitor)  20 mg  HS


 PO  18 21:00


     


 


 


  (Ferrous Sulfate)  325 mg  TIDPC


 PO  18 09:30


    18 10:02


 


 


  (Lopressor)  12.5 mg  BID


 PO  18 09:00


    18 10:02


 








Lines


PIV no evidence of infection


Past Medical History


Chronic Anemia


Osteoarthritis


General Anxiety Disorder


CAD


Old MI


Hx of Headaches


Endometriosis


Ovarian Cysts


Past Surgical History





Cholecystectomy


Tooth Extraction


Tonsillectomy


Allergies:  


Coded Allergies:  


     diphenhydramine (Unverified  Allergy, Unknown, 2/15/18)





Objective


.





Vital Signs








  Date Time  Temp Pulse Resp B/P (MAP) Pulse Ox O2 Delivery O2 Flow Rate FiO2


 


18 11:41 99.0 59 18 163/77 (105) 98   


 


18 09:59  68   99   


 


18 09:25 98.2 56 18 183/96 (125) 99   


 


18 07:00   18     


 


18 05:15 97.2 84 20 142/76 (98) 98   


 


18 01:10 98.0 67 16 134/65 (88) 97   


 


18 20:30 98.7 59 16 148/83 (104) 99   


 


18 15:12 98.8 66 18 153/72 (99) 99   








.





Laboratory Tests








Test


  18


08:39 18


05:55


 


White Blood Count 12.4 TH/MM3  12.9 TH/MM3 


 


Red Blood Count 4.11 MIL/MM3  4.18 MIL/MM3 


 


Hemoglobin 9.3 GM/DL  9.4 GM/DL 


 


Hematocrit 30.1 %  30.8 % 


 


Mean Corpuscular Volume 73.3 FL  73.6 FL 


 


Mean Corpuscular Hemoglobin 22.6 PG  22.5 PG 


 


Mean Corpuscular Hemoglobin


Concent 30.9 % 


  30.6 % 


 


 


Red Cell Distribution Width 32.0 %  31.8 % 


 


Platelet Count 262 TH/MM3  287 TH/MM3 


 


Mean Platelet Volume 7.4 FL  7.5 FL 


 


Neutrophils (%) (Auto) 79.9 %  81.2 % 


 


Lymphocytes (%) (Auto) 14.5 %  14.3 % 


 


Monocytes (%) (Auto) 5.2 %  4.1 % 


 


Eosinophils (%) (Auto) 0.1 %  0.1 % 


 


Basophils (%) (Auto) 0.3 %  0.3 % 


 


Neutrophils # (Auto) 9.9 TH/MM3  10.5 TH/MM3 


 


Lymphocytes # (Auto) 1.8 TH/MM3  1.8 TH/MM3 


 


Monocytes # (Auto) 0.6 TH/MM3  0.5 TH/MM3 


 


Eosinophils # (Auto) 0.0 TH/MM3  0.0 TH/MM3 


 


Basophils # (Auto) 0.0 TH/MM3  0.0 TH/MM3 


 


CBC Comment AUTO DIFF  AUTO DIFF 


 


Differential Comment


  AUTO DIFF


CONFIRMED AUTO DIFF


CONFIRMED


 


Platelet Estimate NORMAL  NORMAL 


 


Platelet Morphology Comment NORMAL  NORMAL 


 


Ovalocytes 1+  








Laboratory Tests








Test


  18


08:38 18


05:55


 


Blood Urea Nitrogen 11 MG/DL  12 MG/DL 


 


Creatinine 0.64 MG/DL  0.64 MG/DL 


 


Random Glucose 106 MG/DL  139 MG/DL 


 


Total Protein 6.5 GM/DL  


 


Albumin 2.8 GM/DL  


 


Calcium Level 8.6 MG/DL  8.5 MG/DL 


 


Alkaline Phosphatase 71 U/L  


 


Aspartate Amino Transf


(AST/SGOT) 11 U/L 


  


 


 


Alanine Aminotransferase


(ALT/SGPT) 24 U/L 


  


 


 


Total Bilirubin 0.2 MG/DL  


 


Sodium Level 141 MEQ/L  141 MEQ/L 


 


Potassium Level 4.3 MEQ/L  4.3 MEQ/L 


 


Chloride Level 108 MEQ/L  107 MEQ/L 


 


Carbon Dioxide Level 23.9 MEQ/L  24.2 MEQ/L 


 


Anion Gap 9 MEQ/L  10 MEQ/L 


 


Estimat Glomerular Filtration


Rate 104 ML/MIN 


  104 ML/MIN 


 


 


Thyroxine (T4) 6.7 MCG/DL  


 


Free Triiodothyronine (T3)


pg/dL 1.34 PG/ML 


  


 


 


Thyroid Stimulating Hormone


3rd Gen 1.280 uIU/ML 


  


 








Physical Exam


GENERAL:   Patient is an obese,  well-developed female,   awake and alert,   

not in respiratory distress.


SKIN:   Cool and dry.  No generalized rash, no ecchymoses and no evidence of 

embolic lesions.


HEAD:   Atraumatic. Normocephalic.  No temporal wasting, or tenderness.


EYES:  Pink conjunctiva. No petechia or hemorrhage.   Pupils equal,  round and 

reactive to light.  Extraocular movements full and intact.   No scleral 

icterus. No injection or drainage. 


EARS, NOSE AND THROAT:   Nose without bleeding or purulent  nasal discharge.  

No sinus tenderness.  Mucous membranes pink and moist. No oral lesions noted. 

No exudate.  No oral thrush.


NECK: Trachea midline.  Supple and not tender, no meningeal signs 


CARDIOVASCULAR: Regular rate and rhythm.  No murmurs, rubs or gallops heard


RESPIRATORY:  Clear to auscultation. Breath sounds equal bilaterally.  No rales

, wheezing or rhonchi


ABDOMEN:  Soft, non-tender, nondistended.  Bowel sounds present and 

normoactive.  No guarding. No rebound.   No organomegaly. 


EXTREMITIES:   No clubbing, cyanosis.  RLE slightly larger compared to LLE.  

There is a bright red area on distal half of her R leg, with peau-d-orange 

texture posteriorly, and improving patches of redness on the medial R foot up 

to the big toe.


No joint effusion, has good ROM.  No open wound noted.  No calf tenderness.  

Well perfused and warm.  


NEUROLOGICAL: Awake and alert.  Cranial nerves grossly intact. Motor grossly 

within normal limits.


PSYCHIATRIC:  Normal affect,  calm and cooperative.


LINE:  No evidence of infection





Assessment & Plan


Remarks


IMPRESSION


Cellulitis RLE, clinically looks like erysipelas, some improvement per patient 

with degree of redness


   -  clinical presentation also the usual Strep infection with fever preceding 

onset of cellulitis


   -  slowly improving








RECOMMENDATION


IV Ancef


Short course of Clindamycin to decrease toxin effects


Elevate RLE


RYLIE stockings


Monitor response to new Rx


If continues to improve, possibly D/C tomorrow on po Keflex





Explained plan to patient


D/W Dr Johnston ([facility])











Rachel Valdivia MD May 22, 2018 12:16

## 2018-05-22 NOTE — HHI.PR
Subjective


Remarks


Follow-up right lower extremity cellulitis/erysipelas.  States it is slowly 

improving tolerating IV Ancef and clindamycin.  Discussed with infectious 

disease, possible discharge in the morning continue IV antibiotics with edema 

control.





Objective


Vitals





Vital Signs








  Date Time  Temp Pulse Resp B/P (MAP) Pulse Ox O2 Delivery O2 Flow Rate FiO2


 


5/22/18 15:04 98.7 62 18 134/68 (90) 99   


 


5/22/18 11:41 99.0 59 18 163/77 (105) 98   


 


5/22/18 09:59  68   99   


 


5/22/18 09:25 98.2 56 18 183/96 (125) 99   


 


5/22/18 07:00   18     


 


5/22/18 05:15 97.2 84 20 142/76 (98) 98   


 


5/22/18 01:10 98.0 67 16 134/65 (88) 97   


 


5/21/18 20:30 98.7 59 16 148/83 (104) 99   














I/O      


 


 5/21/18 5/21/18 5/21/18 5/22/18 5/22/18 5/22/18





 07:00 15:00 23:00 07:00 15:00 23:00


 


Intake Total   1200 ml 790 ml  480 ml


 


Balance   1200 ml 790 ml  480 ml


 


      


 


Intake Oral   1200 ml 360 ml  480 ml


 


IV Total    430 ml  


 


# Voids   5 3  3








Result Diagram:  


5/22/18 0555                                                                   

             5/22/18 0555





Objective Remarks


GENERAL: Well-nourished, well-developed female patient in NAD.


SKIN: Warm and dry.  


CARDIOVASCULAR: Regular rate and rhythm.  No murmur appreciated. 


RESPIRATORY: No accessory muscle use. Clear to auscultation. Breath sounds 

equal bilaterally.  


GASTROINTESTINAL: Abdomen soft, non-tender, nondistended. Normoactive bowel 

sounds x4.


MUSCULOSKELETAL: No obvious deformities. RLE with improving 1+edema and diffuse 

erythema from distal calf to ankle with some patchy punctate areas of erythema 

at medial foot; warm to touch. 


NEUROLOGICAL: Awake and alert. No obvious cranial nerve deficits.  Motor 

grossly within normal limits. Moving all extremities spontaneously. Normal 

speech.


PSYCHIATRIC: Appropriate mood and affect; insight and judgment normal.


Procedures


none





A/P


Problem List:  


(1) Cellulitis


ICD Code:  L03.90 - Cellulitis, unspecified


(2) Vasculitis


ICD Code:  I77.6 - Arteritis, unspecified


Assessment and Plan


37 year old female with history of chronic anemia, arthritis, anxiety, CAD, 

endometriosis, presents with RLE redness and swelling x2days.  





RLE Cellulitis/erysipelas: Gradually improving


   -Continue antibiotics with IV Ancef and clindamycin with edema control.


   -Doubt vasculitis will discontinue IV steroids


   


Chronic Anemia: Hgb 11.8 --> 10.1 --> 9.3 likely dilutional


   -No signs of active bleeding


   -Follow CBC trend





CAD, Old MI: chronic, asymptomatic


   -Follow clinically





DVT Prophylaxis: Lovenox


Discharge Planning


Possible discharge in the morning











Hong Johnston MD May 22, 2018 16:08

## 2018-05-22 NOTE — HHI.DCPOC
Discharge Care Plan


Diagnosis:  


(1) Cellulitis








Your Health Problems Are: Difficulty with ADL





 Exercise Tolerance








Goals to Promote Your Health


* To prevent worsening of your condition and complications


* To maintain your health at the optimal level


Directions to Meet Your Goals


*** Take your medications as prescribed


*** Follow your dietary instruction


*** Follow activity as directed








*** Keep your appointments as scheduled


*** Take your immunizations and boosters as scheduled


*** If your symptoms worsen call your PCP, if no PCP go to Urgent Care Center 

or Emergency Room***


*** Smoking is Dangerous to Your Health. Avoid second hand smoke***


***Call the 24-hour hour crisis hotline for domestic abuse at 1-671.595.5061***











Hong Johnston MD May 22, 2018 16:11

## 2018-05-23 VITALS
SYSTOLIC BLOOD PRESSURE: 129 MMHG | RESPIRATION RATE: 18 BRPM | DIASTOLIC BLOOD PRESSURE: 63 MMHG | HEART RATE: 60 BPM | TEMPERATURE: 97.9 F | OXYGEN SATURATION: 99 %

## 2018-05-23 VITALS
DIASTOLIC BLOOD PRESSURE: 83 MMHG | OXYGEN SATURATION: 97 % | HEART RATE: 57 BPM | RESPIRATION RATE: 16 BRPM | SYSTOLIC BLOOD PRESSURE: 147 MMHG | TEMPERATURE: 97.8 F

## 2018-05-23 VITALS
RESPIRATION RATE: 18 BRPM | OXYGEN SATURATION: 99 % | DIASTOLIC BLOOD PRESSURE: 60 MMHG | SYSTOLIC BLOOD PRESSURE: 127 MMHG | TEMPERATURE: 98.4 F | HEART RATE: 64 BPM

## 2018-05-23 RX ADMIN — CLINDAMYCIN PHOSPHATE SCH MLS/HR: 600 INJECTION, SOLUTION INTRAVENOUS at 05:46

## 2018-05-23 RX ADMIN — HYDROCODONE BITARTRATE AND ACETAMINOPHEN PRN TAB: 10; 325 TABLET ORAL at 10:29

## 2018-05-23 RX ADMIN — ASPIRIN SCH MG: 81 TABLET ORAL at 09:59

## 2018-05-23 RX ADMIN — Medication SCH ML: at 09:59

## 2018-05-23 RX ADMIN — FERROUS SULFATE TAB 325 MG (65 MG ELEMENTAL FE) SCH MG: 325 (65 FE) TAB at 09:59

## 2018-05-23 RX ADMIN — METOPROLOL TARTRATE SCH MG: 25 TABLET, FILM COATED ORAL at 09:59

## 2018-05-23 RX ADMIN — CEFAZOLIN SODIUM SCH MLS/HR: 2 SOLUTION INTRAVENOUS at 05:13

## 2018-05-23 RX ADMIN — STANDARDIZED SENNA CONCENTRATE AND DOCUSATE SODIUM SCH TAB: 8.6; 5 TABLET, FILM COATED ORAL at 09:59

## 2018-05-23 NOTE — HHI.PR
Subjective


Remarks


Follow-up  right lower extremity cellulitis





Objective


Vitals





Vital Signs








  Date Time  Temp Pulse Resp B/P (MAP) Pulse Ox O2 Delivery O2 Flow Rate FiO2


 


5/23/18 08:22 98.4 64 18 127/60 (82) 99   


 


5/23/18 04:01 97.8 57 16 147/83 (104) 97   


 


5/22/18 23:38 98.1 54 16 146/69 (94) 96   


 


5/22/18 19:51 98.4 63 16 146/73 (97) 99   


 


5/22/18 15:04 98.7 62 18 134/68 (90) 99   














I/O      


 


 5/22/18 5/22/18 5/22/18 5/23/18 5/23/18 5/23/18





 07:00 15:00 23:00 07:00 15:00 23:00


 


Intake Total 790 ml  580 ml 591 ml 50 ml 


 


Balance 790 ml  580 ml 591 ml 50 ml 


 


      


 


Intake Oral 360 ml  480 ml 591 ml  


 


IV Total 430 ml  100 ml  50 ml 


 


# Voids 3  3 2  








Result Diagram:  


5/22/18 0555                                                                   

             5/22/18 0555





Objective Remarks


GENERAL: Well-nourished, well-developed female patient in NAD.


SKIN: Warm and dry.  


CARDIOVASCULAR: Regular rate and rhythm.  No murmur appreciated. 


RESPIRATORY: No accessory muscle use. Clear to auscultation. Breath sounds 

equal bilaterally.  


GASTROINTESTINAL: Abdomen soft, non-tender, nondistended. Normoactive bowel 

sounds x4.


MUSCULOSKELETAL: No obvious deformities. RLE with improving 1+edema and diffuse 

erythema from distal calf to ankle with some patchy punctate areas of erythema 

at medial foot; warm to touch. 


NEUROLOGICAL: Awake and alert. No obvious cranial nerve deficits.  Motor 

grossly within normal limits. Moving all extremities spontaneously. Normal 

speech.


PSYCHIATRIC: Appropriate mood and affect; insight and judgment normal.


Procedures


none





A/P


Problem List:  


(1) Cellulitis


ICD Code:  L03.90 - Cellulitis, unspecified


(2) Vasculitis


ICD Code:  I77.6 - Arteritis, unspecified


Assessment and Plan


37 year old female with history of chronic anemia, arthritis, anxiety, CAD, 

endometriosis, presents with RLE redness and swelling x2days.  





RLE Cellulitis/erysipelas: Gradually improving


   -Continue antibiotics with IV Ancef and clindamycin with edema control.


   -Doubt vasculitis will discontinue IV steroids


   


Chronic Anemia: Hgb 11.8 --> 10.1 --> 9.3 likely dilutional


   -No signs of active bleeding


   -Follow CBC trend





CAD, Old MI: chronic, asymptomatic


   -Follow clinically





DVT Prophylaxis: Lovenox


Discharge Planning


Possible discharge in the morning











Hong Johnston MD May 23, 2018 12:27

## 2018-05-23 NOTE — HHI.DS
__________________________________________________





Discharge Summary


Admission Date


May 19, 2018 at 05:02


Discharge Date:  May 23, 2018


Admitting Diagnosis





vasculitis Cellulitis





(1) Cellulitis


ICD Code:  L03.90 - Cellulitis, unspecified


Diagnosis:  Principal





(2) Vasculitis


ICD Code:  I77.6 - Arteritis, unspecified


Diagnosis:  Principal





Procedures


none


Brief History - From Admission


Mrs. Godoy is a 37 year old female.  She has a history of right lower 

extremity redness and swelling.  This has been progressive over the last 2 

days.  Increased swelling, redness, and pain have been a problem.  She decided 

to come in the emergency department due to the progression.  No fevers 

reported.  She has no previous history of this.  No other areas of the body are 

affected.


CBC/BMP:  


5/22/18 0555                                                                   

             5/22/18 0555





Significant Findings





Laboratory Tests








Test


  5/21/18


08:38 5/21/18


08:39 5/22/18


05:55


 


Albumin


  2.8 GM/DL


(3.4-5.0) 


  


 


 


Aspartate Amino Transf


(AST/SGOT) 11 U/L (15-37) 


  


  


 


 


Chloride Level


  108 MEQ/L


() 


  


 


 


Free Triiodothyronine (T3)


pg/dL 1.34 PG/ML


(2.18-3.98) 


  


 


 


White Blood Count


  


  12.4 TH/MM3


(4.0-11.0) 12.9 TH/MM3


(4.0-11.0)


 


Hemoglobin


  


  9.3 GM/DL


(11.6-15.3) 9.4 GM/DL


(11.6-15.3)


 


Hematocrit


  


  30.1 %


(35.0-46.0) 30.8 %


(35.0-46.0)


 


Mean Corpuscular Volume


  


  73.3 FL


(80.0-100.0) 73.6 FL


(80.0-100.0)


 


Mean Corpuscular Hemoglobin


  


  22.6 PG


(27.0-34.0) 22.5 PG


(27.0-34.0)


 


Mean Corpuscular Hemoglobin


Concent 


  30.9 %


(32.0-36.0) 30.6 %


(32.0-36.0)


 


Red Cell Distribution Width


  


  32.0 %


(11.6-17.2) 31.8 %


(11.6-17.2)


 


Neutrophils (%) (Auto)


  


  79.9 %


(16.0-70.0) 81.2 %


(16.0-70.0)


 


Neutrophils # (Auto)


  


  9.9 TH/MM3


(1.8-7.7) 10.5 TH/MM3


(1.8-7.7)


 


Ovalocytes  1+ (NORMAL)  


 


Random Glucose


  


  


  139 MG/DL


()








PE at Discharge


GENERAL: Well-nourished, well-developed female patient in NAD.


SKIN: Warm and dry.  


CARDIOVASCULAR: Regular rate and rhythm.  No murmur appreciated. 


RESPIRATORY: No accessory muscle use. Clear to auscultation. Breath sounds 

equal bilaterally.  


GASTROINTESTINAL: Abdomen soft, non-tender, nondistended. Normoactive bowel 

sounds x4.


MUSCULOSKELETAL: No obvious deformities. RLE with improving 1+edema and diffuse 

erythema from distal calf to ankle with some patchy punctate areas of erythema 

at medial foot; warm to touch. 


NEUROLOGICAL: Awake and alert. No obvious cranial nerve deficits.  Motor 

grossly within normal limits. Moving all extremities spontaneously. Normal 

speech.


PSYCHIATRIC: Appropriate mood and affect; insight and judgment normal.


Hospital Course


37 year old female with history of chronic anemia, arthritis, anxiety, CAD, 

endometriosis, presents with RLE redness and swelling x2days.  





RLE Cellulitis/erysipelas: Improving switch IV Ancef and clindamycin to po 

keflex with edema control.


   -Doubt vasculitis will discontinue IV steroids


   


Chronic Anemia: Hgb 11.8 --> 10.1 --> 9.3 likely dilutional


   -No signs of active bleeding


   -Follow CBC trend





CAD, Old MI: chronic, asymptomatic


   -Follow clinically





DVT Prophylaxis: Lovenox


Pt Condition on Discharge:  Stable


Discharge Disposition:  Discharge Home


Discharge Time:  > 30 minutes


Discharge Instructions


DIET: Follow Instructions for:  As Tolerated, No Restrictions


Activities you can perform:  Regular-No Restrictions


Activities to Avoid:  Driving


Follow up Referrals:  


PCP Follow-up - 1 Week





New Medications:  


Cephalexin (Keflex) 500 Mg Cap


500 MG PO Q8H for Infection, #30 CAP 0 Refills





Hydrocodone/Acetaminophen (Hydrocodone-Acetamin  mg) 10 Mg-325 Mg Tablet


1 TAB PO Q6H PRN for PAIN SCALE 6 TO 10, #12 TAB





 


Continued Medications:  


Aspirin DR (Aspirin DR) 81 Mg Tabdr


81 MG PO DAILY for Blood Clot Prevention, #30 TAB





Atorvastatin (Atorvastatin) 20 Mg Tab


20 MG PO HS for Cholesterol Management, #30 TAB 0 Refills





Ferrous Sulfate (Ferrous Sulfate) 325 Mg (65 Mg Iron) Tablet


325 MG PO TIDPC for Nutritional Supplement, #90 TAB 0 Refills





Metoprolol Tartrate (Metoprolol Tartrate) 25 Mg Tab


12.5 MG PO BID, #60 TAB 0 Refills

















Hong Johnston MD May 23, 2018 16:04